# Patient Record
Sex: FEMALE | Race: BLACK OR AFRICAN AMERICAN | Employment: UNEMPLOYED | URBAN - METROPOLITAN AREA
[De-identification: names, ages, dates, MRNs, and addresses within clinical notes are randomized per-mention and may not be internally consistent; named-entity substitution may affect disease eponyms.]

---

## 2022-08-02 ENCOUNTER — OFFICE VISIT (OUTPATIENT)
Dept: SURGERY | Age: 52
End: 2022-08-02
Payer: MEDICARE

## 2022-08-02 VITALS
BODY MASS INDEX: 38.57 KG/M2 | RESPIRATION RATE: 16 BRPM | TEMPERATURE: 97.2 F | HEART RATE: 66 BPM | HEIGHT: 66 IN | DIASTOLIC BLOOD PRESSURE: 65 MMHG | OXYGEN SATURATION: 100 % | WEIGHT: 240 LBS | SYSTOLIC BLOOD PRESSURE: 117 MMHG

## 2022-08-02 DIAGNOSIS — G47.33 OBSTRUCTIVE SLEEP APNEA SYNDROME: ICD-10-CM

## 2022-08-02 DIAGNOSIS — Z01.818 PREOP EXAMINATION: ICD-10-CM

## 2022-08-02 DIAGNOSIS — R68.89 ABNORMAL WEIGHT: ICD-10-CM

## 2022-08-02 DIAGNOSIS — E66.01 MORBID OBESITY DUE TO EXCESS CALORIES (HCC): Primary | ICD-10-CM

## 2022-08-02 DIAGNOSIS — K91.2 POSTSURGICAL MALABSORPTION: ICD-10-CM

## 2022-08-02 DIAGNOSIS — Z98.84 HISTORY OF GASTRIC BYPASS: ICD-10-CM

## 2022-08-02 DIAGNOSIS — K21.9 GASTROESOPHAGEAL REFLUX DISEASE WITHOUT ESOPHAGITIS: ICD-10-CM

## 2022-08-02 DIAGNOSIS — D53.9 ANEMIA, DEFICIENCY: ICD-10-CM

## 2022-08-02 DIAGNOSIS — I77.9 PERIPHERAL ARTERIAL OCCLUSIVE DISEASE (HCC): ICD-10-CM

## 2022-08-02 PROBLEM — E55.9 VITAMIN D DEFICIENCY: Status: ACTIVE | Noted: 2020-11-09

## 2022-08-02 PROBLEM — G25.81 RESTLESS LEG SYNDROME: Status: ACTIVE | Noted: 2020-07-21

## 2022-08-02 PROBLEM — G62.9 POLYNEUROPATHY: Status: ACTIVE | Noted: 2018-05-16

## 2022-08-02 PROBLEM — R60.0 BILATERAL LEG EDEMA: Status: ACTIVE | Noted: 2017-09-08

## 2022-08-02 PROBLEM — I89.0 LYMPHEDEMA: Status: ACTIVE | Noted: 2017-10-19

## 2022-08-02 PROCEDURE — 99205 OFFICE O/P NEW HI 60 MIN: CPT | Performed by: SURGERY

## 2022-08-02 RX ORDER — GABAPENTIN 300 MG/1
300 CAPSULE ORAL 3 TIMES DAILY
COMMUNITY

## 2022-08-02 RX ORDER — ROPINIROLE 0.5 MG/1
0.5 TABLET, FILM COATED ORAL
COMMUNITY

## 2022-08-02 RX ORDER — PANTOPRAZOLE SODIUM 40 MG/1
40 TABLET, DELAYED RELEASE ORAL DAILY
COMMUNITY
Start: 2021-10-05 | End: 2022-10-05

## 2022-08-02 RX ORDER — FAMOTIDINE 40 MG/1
40 TABLET, FILM COATED ORAL DAILY
COMMUNITY
Start: 2021-10-05

## 2022-08-02 RX ORDER — TOPIRAMATE 25 MG/1
TABLET ORAL 2 TIMES DAILY
COMMUNITY

## 2022-08-02 NOTE — PROGRESS NOTES
Bariatric Surgery Consultation    CC: Consultation from [facility], [address] regarding surgical treatment options for morbid obesity and related comorbidities  Subjective: The patient is a 46 y.o. obese  female with a Body mass index is 39.33 kg/m². . They have been considering surgery for some time now. The patient presents to the clinic today to discuss surgical weight loss options. They have made multiple attempts at weight loss over the years without success. They have tried low-carb, low calorie, high-protein diets, prior open gastric bypass and dietitian supervised diets. Unfortunately, none of these have lead to meaningful, sustained weight loss. They have attended the bariatric seminar before the visit. Open RYGB 1999 with Dr. Emma Linares  Maximum pre-RYGB weight: 282lbs  Nadier post-RYGB weight: 156lbs by 2 years postop  Weight recurrence: began years ago without clear triggers that she can identify    DIET:  2-3 meals per day, large meals/portions  A few years ago struggled with increased calorie intake, but has made multiple dietary adjustments over the last 2 years. Grazing behaviors: chips, occasional chocolate, baked goods, some candy  Reports nausea, vomiting/regurgitation with large meals/portions, 1-2x/week at most, triggered by tougher meats and some starches  Rare dysphagia (cervical level at site of prior C5-C6 fusion)  Reports occasional reflux, triggered by same things that trigger the nausea/regurgitation, takes H2 blocker PRN with relief. Takes multivitamin, D3, occasional B vitamins, but regimen is unclear.  Got iron infusion last year, and has had B vitamin injections in the past.    EXERCISE:  Walking regimen    Pre op weight: 240  EBW: 103  Goal Weight Calc Women: 157.6  Wt loss to date: 0     Patient Active Problem List    Diagnosis Date Noted    Morbid obesity due to excess calories ([facility] 08/02/2022    History of gastric bypass 08/02/2022    Postsurgical malabsorption 2022    Gastroesophageal reflux disease without esophagitis 2021    Anemia, deficiency 2020    Vitamin D deficiency 2020    Obstructive sleep apnea syndrome 2020    Restless leg syndrome 2020    Polyneuropathy 2018    Lymphedema 10/19/2017    Bilateral leg edema 2017    Peripheral arterial occlusive disease ([facility] [address] 2016      Past Medical History:   Diagnosis Date    Anemia     Arthritis     Asthma     Frequency of urination     Lumbago with sciatica     Nocturia     OAB (overactive bladder)     Recurrent UTI     Tendonitis     Urinary urgency     Vitamin B12 deficiency      Past Surgical History:   Procedure Laterality Date    HX ABDOMINOPLASTY      HX  SECTION      x3. HX CHOLECYSTECTOMY      HX COLONOSCOPY      HX GASTRIC BYPASS      HX HYSTERECTOMY      HX HYSTERECTOMY  2013      Social History     Tobacco Use    Smoking status: Never    Smokeless tobacco: Never   Substance Use Topics    Alcohol use: No      History reviewed. No pertinent family history. Prior to Admission medications    Medication Sig Start Date End Date Taking? Authorizing Provider   folic acid-vit T8-SLO Y89 (FOLBEE, AV-ALAINA, VIRT-ALAINA) 2.5-25-1 mg tablet Take 1 Tablet by mouth in the morning. Yes Provider, Historical   famotidine (PEPCID) 40 mg tablet Take 40 mg by mouth in the morning. 10/5/21  Yes Provider, Historical   pantoprazole (PROTONIX) 40 mg tablet Take 40 mg by mouth in the morning. 10/5/21 10/5/22 Yes Provider, Historical   rOPINIRole (REQUIP) 0.5 mg tablet Take 0.5 mg by mouth in the morning. Yes Provider, Historical   gabapentin (NEURONTIN) 300 mg capsule Take 300 mg by mouth three (3) times daily. Yes Provider, Historical   topiramate (Topamax) 25 mg tablet Take  by mouth two (2) times a day. Yes Provider, Historical   cyclobenzaprine (FLEXERIL) 10 mg tablet 10 mg.    Yes Provider, Historical   albuterol (PROVENTIL HFA, VENTOLIN HFA, PROAIR HFA) 90 mcg/actuation inhaler Take  inhaled by mouth Take As Needed. Yes Provider, Historical   cholecalciferol (VITAMIN D3) 25 mcg (1,000 unit) cap Take  by mouth daily. Yes Provider, Historical   aspirin 81 mg chewable tablet Take 81 mg by mouth daily. Yes Provider, Historical   mirabegron ER (MYRBETRIQ) 50 mg ER tablet Take 1 Tab by mouth daily. 7/26/17  Yes Niurka Gomez MD   LYRICA 50 mg capsule  7/20/17   Provider, Historical   pravastatin (PRAVACHOL) 40 mg tablet 40 mg. Patient not taking: Reported on 8/2/2022    Provider, Historical   montelukast (SINGULAIR) 10 mg tablet Take 10 mg by mouth daily. Patient not taking: Reported on 8/2/2022    Provider, Historical   fluticasone propionate (FLONASE) 50 mcg/actuation nasal spray 2 Sprays by Both Nostrils route daily.   Patient not taking: Reported on 8/2/2022    Provider, Historical     Allergies   Allergen Reactions    Codeine Other (comments)     abd cramping    Duloxetine Hcl Other (comments)     Other reaction(s): GI upset    Sertraline Other (comments)     GI upset    Tramadol Other (comments)     Gi distress          Review of Systems:    Constitutional: No fever or chills  Neurologic: No headache  Eyes: No scleral icterus or irritated eyes  Nose: No nasal pain or drainage  Mouth: No oral lesions or sore throat  Cardiac: No palpations or chest pain  Pulmonary: No cough or shortness of breath  Gastrointestinal: See HPI, no diarrhea/constipation  Genitourinary: No dysuria  Musculoskeletal: No muscle or joint tenderness  Skin: No rashes or lesions  Psychiatric: No anxiety or depressed mood    Pertinent positives: See HPI      Objective:     Physical Exam:  Visit Vitals  /65   Pulse 66   Temp 97.2 °F (36.2 °C)   Resp 16   Ht 5' 5.5\" (1.664 m)   Wt 108.9 kg (240 lb)   SpO2 100%   BMI 39.33 kg/m²     General: No acute distress, conversant  Eyes: PERRLA, no scleral icterus  HENT: Normocephalic without oral lesions  Neck: Trachea midline  Cardiac: Normal pulse rate and rhythm, no edema, capillary refill normal 1 second  Pulmonary: Symmetric chest rise with normal effort, clear to auscultation though mildly obscured by body habitus  GI: Soft, NT, ND, no hernia, no splenomegaly  Skin: Warm without rash  Extremities: No edema or joint stiffness, moves all extremities symmetrically, steady gait  Psych: Appropriate mood and affect    Assessment:     Morbid obesity with comorbidities, history of gastric bypass, recurrent morbid obesity  Plan:   The patient presents today with severe obesity and obesity related risks/comorbidities as detailed above. The patient has made multiple unsuccessful attempts at weight loss as detailed above. The patient desires surgical weight loss for a better chance of lifelong weight control and control of co-morbidities. They have attended the bariatric seminar and meet the qualifications for surgery based on the NIH criteria. We have discussed the various surgical options including the sleeve gastrectomy, gastric bypass, duodenal switch/modified duodenal switch. We also discussed non operative alternatives. The patient is currently interested in the revision of gastric bypass. They will need to a/an UGI and EGD with me to evaluate their anatomy pre operatively. H pylori antigen/breath test ordered as well.     We have discussed the possible complications of bariatric surgery which include but are not limited to failed weight loss, weight regain, malnutrition, leak, bleed, stricture, gastric ulcer, gastric fistula, gastric bleed, gallstones, new or worsening gastric reflux, nausea, emesis, internal hernia, abdominal wall hernia, gastric perforation, need for revision / conversion / or reversal, pregnancy complications and loss, intestinal ischemia, post operative skin complications, possible thinning of their hair, bowel obstruction, dumping syndrome, wound infection, blood clots (DVT, mesenteric thrombus, pulmonary embolism), increased addictive tendency, risk of anesthesia, and death. The patient understands this is a life altering decision and will require compliance to the program for the remainder of their life in order to be monitored to avoid complication and ensure successful, sustained weight control. They will be placed on a lifelong low carbohydrate and low sugar diet, exercise, and vitamin regimen and will require frequent blood draws and office visits to ensure adequate nutrition and program compliance. Visits and follow up will be in compliance with the guidelines set forth by [facility]. I have specifically mentioned the need to avoid all personal and second-hand tobacco exposure, systemic steroids, and NSAIDS after any bariatric surgery to help avoid the above listed complications. The patient has expressed understanding of the above and would like to enroll in the program. The patient will be submitted for medical and psychological clearance along with establishing with out dietician and joining the pre / post operative support group. They will be screened for depression and sleep apnea and treated pre operatively if needed. After successful completion of the preoperative regimen the patient will be submitted for insurance approval and pending this will be scheduled for surgery. Tests/clearances ordered: Will start with labs, EGD and UGI prior to starting rest of program    Bariatric lab panel, CXR, EKG, UGI, EGD with me  Nutrition, support group, PCP clearance, psychological clearance, cardiac clearance      All questions from the patient have been answered and they have demonstrated appropriate understanding of the process. RESULTS:   UGI outside report (images not available):   \"Focal neostomach contrast filled outpouching, tertiary nonpropulsive contractions of the esophagus, LANDON    Thiamin normal  H pylori negative    Signed By: Oni Estes MD [facility]  Bariatric and [address] [place] [address] [facility]    August 2, 2022

## 2022-08-02 NOTE — PROGRESS NOTES
Dereck Austin is a 46 y.o. female (: 1970) presenting to address:    Chief Complaint   Patient presents with    New Patient     Bariatric revision consult       Medication list and allergies have been reviewed with Dereck Austin and updated as of today's date. I have gone over all Medical, Surgical and Social History with Dereck Austin and updated/added the information accordingly.

## 2022-08-02 NOTE — LETTER
8/2/2022    Patient: Jose Guadalupe Andrews   YOB: 1970   Date of Visit: 8/2/2022     Lucero Emerson PA-C  [address] [address]  Via Fax: [phone]     [facility] [address] [address]  Via Fax: [phone]    Dear Lucero Emerson PA-C  [address] [place] [address], [address],      Thank you for referring Ms. Jose Guadalupe Andrews to [facility]  for evaluation. My notes for this consultation are attached. If you have questions, please do not hesitate to call me. I look forward to following your patient along with you.       Sincerely,    Cassius Varma MD

## 2022-09-12 RX ORDER — FERROUS SULFATE, DRIED 160(50) MG
1 TABLET, EXTENDED RELEASE ORAL DAILY
COMMUNITY

## 2022-09-12 NOTE — PERIOP NOTES
PRE-SURGICAL INSTRUCTIONS        Patient's Name:  Renzo MAHAJAN Date:  9/12/2022            Covid Testing Date and Time:    Surgery Date:  9/16/2022                Do NOT eat or drink anything, including candy, gum, or ice chips after midnight on 09/15, unless you have specific instructions from your surgeon or anesthesia provider to do so. You may brush your teeth before coming to the hospital.  No smoking 24 hours prior to the day of surgery. No alcohol 24 hours prior to the day of surgery. No recreational drugs for one week prior to the day of surgery. Leave all valuables, including money/purse, at home. Remove all jewelry, nail polish, acrylic nails, and makeup (including mascara); no lotions powders, deodorant, or perfume/cologne/after shave on the skin. Follow instruction for Hibiclens washes and CHG wipes from surgeon's office. Glasses/contact lenses and dentures may be worn to the hospital.  They will be removed prior to surgery. Call your doctor if symptoms of a cold or illness develop within 24-48 hours prior to your surgery. 11.  If you are having an outpatient procedure, please make arrangements for a responsible ADULT TO [address] [place] [address] and stay with you for 24 hours after your surgery. 12. ONE VISITOR in the hospital at this time for outpatient procedures. Exceptions may be made for surgical admissions, per nursing unit guidelines      Special Instructions:      Bring list of CURRENT medications. Bring inhaler. Bring any pertinent legal medical records. Take these medications the morning of surgery with a sip of water:  None  Follow physician instructions about stopping anticoagulants. On the day of surgery, come in the main entrance of [facility]. Let the  at the desk know you are there for surgery. A staff member will come escort you to the surgical area on the second floor.     If you have any questions or concerns, please do not hesitate to call:     (Prior to the day of surgery) [facility] department:  [phone]   (Day of surgery) Pre-Op department:  [phone]    These surgical instructions were reviewed with Danay Darling during the [facility] phone call.

## 2022-09-15 ENCOUNTER — ANESTHESIA EVENT (OUTPATIENT)
Dept: ENDOSCOPY | Age: 52
End: 2022-09-15
Payer: MEDICARE

## 2022-09-16 ENCOUNTER — HOSPITAL ENCOUNTER (OUTPATIENT)
Age: 52
Setting detail: OUTPATIENT SURGERY
Discharge: HOME | End: 2022-09-16
Attending: SURGERY | Admitting: SURGERY
Payer: MEDICARE

## 2022-09-16 ENCOUNTER — ANESTHESIA (OUTPATIENT)
Dept: ENDOSCOPY | Age: 52
End: 2022-09-16
Payer: MEDICARE

## 2022-09-16 VITALS
HEART RATE: 71 BPM | TEMPERATURE: 97.3 F | HEIGHT: 65 IN | SYSTOLIC BLOOD PRESSURE: 96 MMHG | WEIGHT: 242 LBS | RESPIRATION RATE: 15 BRPM | BODY MASS INDEX: 40.32 KG/M2 | DIASTOLIC BLOOD PRESSURE: 65 MMHG | OXYGEN SATURATION: 100 %

## 2022-09-16 PROCEDURE — 2709999900 HC NON-CHARGEABLE SUPPLY: Performed by: SURGERY

## 2022-09-16 PROCEDURE — 77030008565 HC TBNG SUC IRR ERBE -B: Performed by: SURGERY

## 2022-09-16 PROCEDURE — 00731 ANES UPR GI NDSC PX NOS: CPT | Performed by: NURSE ANESTHETIST, CERTIFIED REGISTERED

## 2022-09-16 PROCEDURE — 74011250636 HC RX REV CODE- 250/636: Performed by: NURSE ANESTHETIST, CERTIFIED REGISTERED

## 2022-09-16 PROCEDURE — 74011000250 HC RX REV CODE- 250: Performed by: NURSE ANESTHETIST, CERTIFIED REGISTERED

## 2022-09-16 PROCEDURE — 76060000031 HC ANESTHESIA FIRST 0.5 HR: Performed by: SURGERY

## 2022-09-16 PROCEDURE — 00731 ANES UPR GI NDSC PX NOS: CPT | Performed by: ANESTHESIOLOGY

## 2022-09-16 PROCEDURE — 43235 EGD DIAGNOSTIC BRUSH WASH: CPT | Performed by: SURGERY

## 2022-09-16 PROCEDURE — 76040000019: Performed by: SURGERY

## 2022-09-16 RX ORDER — INSULIN LISPRO 100 [IU]/ML
INJECTION, SOLUTION INTRAVENOUS; SUBCUTANEOUS ONCE
Status: DISCONTINUED | OUTPATIENT
Start: 2022-09-16 | End: 2022-09-16 | Stop reason: HOSPADM

## 2022-09-16 RX ORDER — SODIUM CHLORIDE, SODIUM LACTATE, POTASSIUM CHLORIDE, CALCIUM CHLORIDE 600; 310; 30; 20 MG/100ML; MG/100ML; MG/100ML; MG/100ML
50 INJECTION, SOLUTION INTRAVENOUS CONTINUOUS
Status: DISCONTINUED | OUTPATIENT
Start: 2022-09-16 | End: 2022-09-16 | Stop reason: HOSPADM

## 2022-09-16 RX ORDER — PROPOFOL 10 MG/ML
INJECTION, EMULSION INTRAVENOUS AS NEEDED
Status: DISCONTINUED | OUTPATIENT
Start: 2022-09-16 | End: 2022-09-16 | Stop reason: HOSPADM

## 2022-09-16 RX ADMIN — SODIUM CHLORIDE, POTASSIUM CHLORIDE, SODIUM LACTATE AND CALCIUM CHLORIDE 50 ML/HR: 600; 310; 30; 20 INJECTION, SOLUTION INTRAVENOUS at 12:38

## 2022-09-16 RX ADMIN — PROPOFOL 50 MG: 10 INJECTION, EMULSION INTRAVENOUS at 13:16

## 2022-09-16 RX ADMIN — PROPOFOL 30 MG: 10 INJECTION, EMULSION INTRAVENOUS at 13:19

## 2022-09-16 RX ADMIN — FAMOTIDINE 20 MG: 10 INJECTION, SOLUTION INTRAVENOUS at 12:36

## 2022-09-16 RX ADMIN — PROPOFOL 20 MG: 10 INJECTION, EMULSION INTRAVENOUS at 13:18

## 2022-09-16 RX ADMIN — PROPOFOL 50 MG: 10 INJECTION, EMULSION INTRAVENOUS at 13:21

## 2022-09-16 NOTE — DISCHARGE INSTRUCTIONS
Upper GI Endoscopy: What to Expect at [address] had an upper GI endoscopy. Your doctor used a thin, lighted tube that bends to look at the inside of your esophagus, your stomach, and the first part of the small intestine, called the duodenum. After you have an endoscopy, you will stay at the hospital or clinic for 1 to 2 hours. This will allow the medicine to wear off. You will be able to go home after your doctor or nurse checks to make sure that you're not having any problems. You may have to stay overnight if you had treatment during the test. You may have a sore throat for a day or two after the test.  This care sheet gives you a general idea about what to expect after the test.  How can you care for yourself at home? Activity   Rest as much as you need to after you go home. You should be able to go back to your usual activities the day after the test.  Diet   Follow your doctor's directions for eating after the test.  Drink plenty of fluids (unless your doctor has told you not to). Medications   If you have a sore throat the day after the test, use an over-the-counter spray to numb your throat. Follow-up care is a key part of your treatment and safety. Be sure to make and go to all appointments, and call your doctor if you are having problems. It's also a good idea to know your test results and keep a list of the medicines you take. When should you call for help? Call 911 anytime you think you may need emergency care. For example, call if:    You passed out (lost consciousness). You have trouble breathing. You pass maroon or bloody stools. Call your doctor now or seek immediate medical care if:    You have pain that does not get better after your take pain medicine. You have new or worse belly pain. You have blood in your stools. You are sick to your stomach and cannot keep fluids down. You have a fever. You cannot pass stools or gas.    Watch closely for changes in your health, and be sure to contact your doctor if:    Your throat still hurts after a day or two. You do not get better as expected. Where can you learn more? Go to http://www.bang.com/  Enter J454 in the search box to learn more about \"Upper GI Endoscopy: What to Expect at Home. \"  Current as of: September 8, 2021               Content Version: 13.2  © 2006-2022 [employer]. Care instructions adapted under license by [employer] (which disclaims liability or warranty for this information). If you have questions about a medical condition or this instruction, always ask your healthcare professional. [place] [address] any warranty or liability for your use of this information. DISCHARGE SUMMARY from Nurse     POST-PROCEDURE INSTRUCTIONS:    Call your Physician if you:  Observe any excess bleeding. Develop a temperature over 100.5o F. Experience abdominal, shoulder or chest pain. Notice any signs of decreased circulation or nerve impairment to an extremity such as a change in color, persistent numbness, tingling, coldness or increase in pain. Vomit blood or you have nausea and vomiting lasting longer than 4 hours. Are unable to take medications. Are unable to urinate within 8 hours after discharge following general anesthesia or intravenous sedation. For the next 24 hours after receiving general anesthesia or intravenous sedation, or while taking prescription Narcotics, limit your activities:  Do NOT drive a motor vehicle, operate hazard machinery or power tools, or perform tasks that require coordination. The medication you received during your procedure may have some effect on your mental awareness. Do NOT make important personal or business decisions. The medication you received during your procedure may have some effect on your mental awareness. Do NOT drink alcoholic beverages.   These drinks do not mix well with the medications that have been given to you. Upon discharge from the hospital, you must be accompanied by a responsible adult. Resume your diet as directed by your physician. Resume medications as your physician has prescribed. Please give a list of your current medications to your Primary Care Provider. Please update this list whenever your medications are discontinued, doses are changed, or new medications (including over-the-counter products) are added. Please carry medication information at all times in case of emergency situations. These are general instructions for a healthy lifestyle:    No smoking/ No tobacco products/ Avoid exposure to second hand smoke. Surgeon General's Warning:  Quitting smoking now greatly reduces serious risk to your health. Obesity, smoking, and a sedentary lifestyle greatly increase your risk for illness. A healthy diet, regular physical exercise & weight monitoring are important for maintaining a healthy lifestyle  You may be retaining fluid if you have a history of heart failure or if you experience any of the following symptoms:  Weight gain of 3 pounds or more overnight or 5 pounds in a week, increased swelling in our hands or feet or shortness of breath while lying flat in bed. Please call your doctor as soon as you notice any of these symptoms; do not wait until your next office visit. Recognize signs and symptoms of STROKE:  F  -  Face looks uneven  A  -  Arms unable to move or move unevenly  S  -  Speech slurred or non-existent  T  -  Time to call 911 - as soon as signs and symptoms begin - DO NOT go back to bed or wait to see If you get better - TIME IS BRAIN. Colorectal Screening  Colorectal cancer almost always develops from precancerous polyps (abnormal growths) in the colon or rectum. Screening tests can find precancerous polyps, so that they can be removed before they turn into cancer.  Screening tests can also find colorectal cancer early, when treatment works best.  Speak with your physician about when you should begin screening and how often you should be tested. [employer] Activation    Thank you for requesting access to [facility] ([facility]). Please follow the instructions below to securely access and download your online medical record. [facility] ([facility]) allows you to send messages to your doctor, view your test results, renew your prescriptions, schedule appointments, and more. How Do I Sign Up? In your internet browser, go to [email or web address]. Click on the First Time User? Click Here link in the Sign In box. You will see the New Member Sign Up page. Enter your [employer] Access Code exactly as it appears below. You will not need to use this code after youve completed the sign-up process. If you do not sign up before the expiration date, you must request a new code. [facility] ([facility]) Access Code: Activation code not generated  Current [facility] ([facility]) Status: Active (This is the date your [facility] ([facility]) access code will )    Enter the last four digits of your Social Security Number (xxxx) and Date of Birth (mm/dd/yyyy) as indicated and click Submit. You will be taken to the next sign-up page. Create a [employer] ID. This will be your [facility] (HDS) login ID and cannot be changed, so think of one that is secure and easy to remember. Create a [facility] ([facility]) password. You can change your password at any time. Enter your Password Reset Question and Answer. This can be used at a later time if you forget your password. Enter your e-mail address. You will receive e-mail notification when new information is available in [address] Click Sign Up. You can now view and download portions of your medical record. Click the [employer] link to download a portable copy of your medical information. Additional Information    If you have questions, please call [phone]. Remember, [facility] ([facility]) is NOT to be used for urgent needs.  For medical emergencies, dial 911. Educational references and/or instructions provided during this visit included:    See Attached      APPOINTMENTS:    Per MD Instruction    Discharge information has been reviewed with the patient. The patient verbalized understanding.     Patient armband removed and shredded

## 2022-09-16 NOTE — ANESTHESIA PREPROCEDURE EVALUATION
Relevant Problems   RESPIRATORY SYSTEM   (+) Obstructive sleep apnea syndrome      CARDIOVASCULAR   (+) Peripheral arterial occlusive disease (HCC)      GASTROINTESTINAL   (+) Gastroesophageal reflux disease without esophagitis      ENDOCRINE   (+) Morbid obesity due to excess calories (HCC)      HEMATOLOGY   (+) Anemia, deficiency       Anesthetic History   No history of anesthetic complications            Review of Systems / Medical History  Patient summary reviewed and pertinent labs reviewed    Pulmonary        Sleep apnea    Asthma        Neuro/Psych              Cardiovascular                       GI/Hepatic/Renal                Endo/Other        Morbid obesity and arthritis     Other Findings              Physical Exam    Airway  Mallampati: III  TM Distance: 4 - 6 cm  Neck ROM: normal range of motion   Mouth opening: Diminished (comment)     Cardiovascular    Rhythm: regular  Rate: normal         Dental    Dentition: Poor dentition     Pulmonary      Decreased breath sounds: bilateral           Abdominal  GI exam deferred       Other Findings            Anesthetic Plan    ASA: 3  Anesthesia type: MAC            Anesthetic plan and risks discussed with: Patient

## 2022-09-16 NOTE — H&P
The patient is a 46 y.o. obese  female with a Body mass index is 39.33 kg/m². . They have been considering surgery for some time now. The patient presents to the clinic today to discuss surgical weight loss options. They have made multiple attempts at weight loss over the years without success. They have tried low-carb, low calorie, high-protein diets, prior open gastric bypass and dietitian supervised diets. Unfortunately, none of these have lead to meaningful, sustained weight loss. They have attended the bariatric seminar before the visit. Open RYGB 1999 with Dr. Sunny Shah  Maximum pre-RYGB weight: 282lbs  Nadier post-RYGB weight: 156lbs by 2 years postop  Weight recurrence: began years ago without clear triggers that she can identify     DIET:  2-3 meals per day, large meals/portions  A few years ago struggled with increased calorie intake, but has made multiple dietary adjustments over the last 2 years. Grazing behaviors: chips, occasional chocolate, baked goods, some candy  Reports nausea, vomiting/regurgitation with large meals/portions, 1-2x/week at most, triggered by tougher meats and some starches  Rare dysphagia (cervical level at site of prior C5-C6 fusion)  Reports occasional reflux, triggered by same things that trigger the nausea/regurgitation, takes H2 blocker PRN with relief. Takes multivitamin, D3, occasional B vitamins, but regimen is unclear.  Got iron infusion last year, and has had B vitamin injections in the past.     EXERCISE:  Walking regimen     Pre op weight: 240  EBW: 103  Goal Weight Calc Women: 157.6  Wt loss to date: 0           Patient Active Problem List     Diagnosis Date Noted    Morbid obesity due to excess calories ([facility] [address] 08/02/2022    History of gastric bypass 08/02/2022    Postsurgical malabsorption 08/02/2022    Gastroesophageal reflux disease without esophagitis 03/22/2021    Anemia, deficiency 11/09/2020    Vitamin D deficiency 11/09/2020    Obstructive sleep apnea syndrome 2020    Restless leg syndrome 2020    Polyneuropathy 2018    Lymphedema 10/19/2017    Bilateral leg edema 2017    Peripheral arterial occlusive disease ([facility] [address] 2016           Past Medical History:   Diagnosis Date    Anemia      Arthritis      Asthma      Frequency of urination      Lumbago with sciatica      Nocturia      OAB (overactive bladder)      Recurrent UTI      Tendonitis      Urinary urgency      Vitamin B12 deficiency              Past Surgical History:   Procedure Laterality Date    HX ABDOMINOPLASTY        HX  SECTION         x3. HX CHOLECYSTECTOMY        HX COLONOSCOPY        HX GASTRIC BYPASS       HX HYSTERECTOMY        HX HYSTERECTOMY   2013      Social History           Tobacco Use    Smoking status: Never    Smokeless tobacco: Never   Substance Use Topics    Alcohol use: No      History reviewed. No pertinent family history. Prior to Admission medications    Medication Sig Start Date End Date Taking? Authorizing Provider   folic acid-vit Z1-NFR Z99 (FOLBEE, AV-ALAINA, VIRT-ALAINA) 2.5-25-1 mg tablet Take 1 Tablet by mouth in the morning. Yes Provider, Historical   famotidine (PEPCID) 40 mg tablet Take 40 mg by mouth in the morning. 10/5/21   Yes Provider, Historical   pantoprazole (PROTONIX) 40 mg tablet Take 40 mg by mouth in the morning. 10/5/21 10/5/22 Yes Provider, Historical   rOPINIRole (REQUIP) 0.5 mg tablet Take 0.5 mg by mouth in the morning. Yes Provider, Historical   gabapentin (NEURONTIN) 300 mg capsule Take 300 mg by mouth three (3) times daily. Yes Provider, Historical   topiramate (Topamax) 25 mg tablet Take  by mouth two (2) times a day. Yes Provider, Historical   cyclobenzaprine (FLEXERIL) 10 mg tablet 10 mg. Yes Provider, Historical   albuterol (PROVENTIL HFA, VENTOLIN HFA, PROAIR HFA) 90 mcg/actuation inhaler Take  inhaled by mouth Take As Needed.      Yes Provider, Historical cholecalciferol (VITAMIN D3) 25 mcg (1,000 unit) cap Take  by mouth daily. Yes Provider, Historical   aspirin 81 mg chewable tablet Take 81 mg by mouth daily. Yes Provider, Historical   mirabegron ER (MYRBETRIQ) 50 mg ER tablet Take 1 Tab by mouth daily. 7/26/17   Yes Kandi Miller MD   LYRICA 50 mg capsule   7/20/17     Provider, Historical   pravastatin (PRAVACHOL) 40 mg tablet 40 mg. Patient not taking: Reported on 8/2/2022       Provider, Historical   montelukast (SINGULAIR) 10 mg tablet Take 10 mg by mouth daily. Patient not taking: Reported on 8/2/2022       Provider, Historical   fluticasone propionate (FLONASE) 50 mcg/actuation nasal spray 2 Sprays by Both Nostrils route daily.   Patient not taking: Reported on 8/2/2022       Provider, Historical            Allergies   Allergen Reactions    Codeine Other (comments)       abd cramping    Duloxetine Hcl Other (comments)       Other reaction(s): GI upset    Sertraline Other (comments)       GI upset    Tramadol Other (comments)       Gi distress            Review of Systems:    Constitutional: No fever or chills  Neurologic: No headache  Eyes: No scleral icterus or irritated eyes  Nose: No nasal pain or drainage  Mouth: No oral lesions or sore throat  Cardiac: No palpations or chest pain  Pulmonary: No cough or shortness of breath  Gastrointestinal: See HPI, no diarrhea/constipation  Genitourinary: No dysuria  Musculoskeletal: No muscle or joint tenderness  Skin: No rashes or lesions  Psychiatric: No anxiety or depressed mood     Pertinent positives: See HPI        Objective:      Physical Exam:  Visit Vitals  /65   Pulse 66   Temp 97.2 °F (36.2 °C)   Resp 16   Ht 5' 5.5\" (1.664 m)   Wt 108.9 kg (240 lb)   SpO2 100%   BMI 39.33 kg/m²      General: No acute distress, conversant  Eyes: PERRLA, no scleral icterus  HENT: Normocephalic without oral lesions  Neck: Trachea midline  Cardiac: Normal pulse rate and rhythm, no edema, capillary refill normal 1 second  Pulmonary: Symmetric chest rise with normal effort, clear to auscultation though mildly obscured by body habitus  GI: Soft, NT, ND, no hernia, no splenomegaly  Skin: Warm without rash  Extremities: No edema or joint stiffness, moves all extremities symmetrically, steady gait  Psych: Appropriate mood and affect     Assessment:      Morbid obesity with comorbidities, history of gastric bypass, recurrent morbid obesity  Plan:   EGD today

## 2022-09-16 NOTE — BRIEF OP NOTE
Brief Postoperative Note    Patient: Blanka Lawler  YOB: 1970  MRN: [id number]    Date of Procedure: 9/16/2022     Pre-Op Diagnosis: Morbid obesity ([facility] [address] [E66.01]  Gastroesophageal reflux disease, unspecified whether esophagitis present [K21.9]  Postsurgical malabsorption [K91.2]  H/O gastric bypass [Z98.84]  Peripheral arterial occlusive disease ([facility] [address] [I77.9]  Anemia, unspecified type [D64.9]  JOSIE (obstructive sleep apnea) [G47.33]    Post-Op Diagnosis:   Morbid obesity ([address] [E66.01]  Gastroesophageal reflux disease, unspecified whether esophagitis present [K21.9]  Postsurgical malabsorption [K91.2]  H/O gastric bypass [Z98.84]  Peripheral arterial occlusive disease ([address] [I77.9]  Anemia, unspecified type [D64.9]  JOSIE (obstructive sleep apnea) [G47.33]    Procedure(s):  ESOPHAGOGASTRODUODENOSCOPY (EGD)    Surgeon(s):  Keyanna Irving MD    Surgical Assistant: None    Anesthesia: MAC     Estimated Blood Loss (mL): Minimal    Complications: None    Specimens: * No specimens in log *     Implants: * No implants in log *    Drains: * No LDAs found *    Findings:   3x4cm gastric pouch  3cm gastrojejunal anastomosis (dilated gastrojejunostomy, technical failure of gastric bypass)  5cm blind end of jaci limb (candy cane)  Normal jaci limb mucosa  Unable to retroflex in pouch due to small pouch, possible laxity at hiatus    Electronically Signed by Abbey Avitia MD on 9/16/2022 at 1:25 PM

## 2022-09-16 NOTE — ANESTHESIA POSTPROCEDURE EVALUATION
Procedure(s):  ESOPHAGOGASTRODUODENOSCOPY (EGD). MAC    Anesthesia Post Evaluation      Multimodal analgesia: multimodal analgesia used between 6 hours prior to anesthesia start to PACU discharge  Patient location during evaluation: bedside  Patient participation: complete - patient participated  Level of consciousness: awake  Pain management: adequate  Airway patency: patent  Anesthetic complications: no  Cardiovascular status: stable  Respiratory status: acceptable  Hydration status: acceptable  Post anesthesia nausea and vomiting:  controlled      INITIAL Post-op Vital signs:   Vitals Value Taken Time   BP 99/73 09/16/22 1354   Temp 36.7 °C (98 °F) 09/16/22 1329   Pulse 67 09/16/22 1356   Resp 16 09/16/22 1356   SpO2 100 % 09/16/22 1356   Vitals shown include unvalidated device data.

## 2022-09-16 NOTE — OP NOTES
Operative Report    Patient: William Grove MRN: [id number]  SSN: [id number]    YOB: 1970  Age: 46 y.o. Sex: female       Date of Surgery: 9/16/2022     Preoperative Diagnosis: Morbid obesity ([facility] [address] [E66.01]  Gastroesophageal reflux disease, unspecified whether esophagitis present [K21.9]  Postsurgical malabsorption [K91.2]  H/O gastric bypass [Z98.84]  Peripheral arterial occlusive disease (HCC) [I77.9]  Anemia, unspecified type [D64.9]  JOSIE (obstructive sleep apnea) [G47.33]     Postoperative Diagnosis: * No post-op diagnosis entered *     Surgeon(s) and Role:     * Celina Wilson MD - Primary    Anesthesia: MAC     Procedure: Procedure(s):  ESOPHAGOGASTRODUODENOSCOPY (EGD)     Procedure in Detail: William Grove was identified in the pre-operative holding area. Informed consent was obtained after a complete discussion of risks, benefits and alternatives to surgery were had with the patient. The patient was brought back to the endoscopy room and placed under MAC in the supine position on the operating room table. A timeout was performed verifying patient identity, planned procedure, medications, and all other pertinent aspects of the case. The patient was appropriately padded and secured to the table. A bite block was applied. Once adequate sedation was achieved, the gastroscope was introduced transorally into the esophagus. The esophagus was traversed. Findings:   3x4cm gastric pouch  3cm gastrojejunal anastomosis (dilated gastrojejunostomy, technical failure of gastric bypass)  5cm blind end of jaci limb (candy cane)  Normal jaci limb mucosa  Unable to retroflex in pouch due to small pouch, possible laxity at hiatus    The scope was utilized to suction flat the stomach and was removed. The patient tolerated the procedure without incident and was awakened and transferred to PACU.       Estimated Blood Loss:  none    Tourniquet Time: * No tourniquets in log *      Implants: * No implants in log *            Specimens: * No specimens in log *        Drains: None                Complications: None    Counts: Sponge and needle counts were correct times two.     Signed By:  Liv Cabral MD     September 16, 2022

## 2022-10-04 ENCOUNTER — OFFICE VISIT (OUTPATIENT)
Dept: SURGERY | Age: 52
End: 2022-10-04
Payer: MEDICARE

## 2022-10-04 VITALS
HEIGHT: 66 IN | DIASTOLIC BLOOD PRESSURE: 64 MMHG | RESPIRATION RATE: 16 BRPM | SYSTOLIC BLOOD PRESSURE: 117 MMHG | BODY MASS INDEX: 40.02 KG/M2 | WEIGHT: 249 LBS | TEMPERATURE: 97.5 F | HEART RATE: 66 BPM

## 2022-10-04 DIAGNOSIS — Z98.84 HISTORY OF GASTRIC BYPASS: ICD-10-CM

## 2022-10-04 DIAGNOSIS — K91.1 POSTSURGICAL DUMPING SYNDROME: ICD-10-CM

## 2022-10-04 DIAGNOSIS — G47.33 OBSTRUCTIVE SLEEP APNEA SYNDROME: ICD-10-CM

## 2022-10-04 DIAGNOSIS — D53.9 ANEMIA, DEFICIENCY: ICD-10-CM

## 2022-10-04 DIAGNOSIS — E55.9 VITAMIN D DEFICIENCY: ICD-10-CM

## 2022-10-04 DIAGNOSIS — K95.89 COMPLICATIONS OF BARIATRIC PROCEDURES: ICD-10-CM

## 2022-10-04 DIAGNOSIS — K91.2 POSTSURGICAL MALABSORPTION: Primary | ICD-10-CM

## 2022-10-04 PROCEDURE — 99213 OFFICE O/P EST LOW 20 MIN: CPT | Performed by: SURGERY

## 2022-10-04 RX ORDER — ASPIRIN 325 MG
50000 TABLET, DELAYED RELEASE (ENTERIC COATED) ORAL
Qty: 12 CAPSULE | Refills: 0 | Status: CANCELLED | OUTPATIENT
Start: 2022-10-04

## 2022-10-04 NOTE — PROGRESS NOTES
Gwyn Jhaveri is a 46 y.o. female (: 1970) presenting to address:    Chief Complaint   Patient presents with    Follow-up     EGD and lab follow up       Medication list and allergies have been reviewed with Gwyn Jhaveri and updated as of today's date. I have gone over all Medical, Surgical and Social History with Gwyn Jhaveri and updated/added the information accordingly.

## 2022-10-04 NOTE — PROGRESS NOTES
Bariatric Surgery Progress Note    CC: Preop appointment for bariatric surgery  Subjective:     Patient presents for a preop appointment for bariatric surgery. Labs reviewed:  Vitamin D deficiency  Elevated .9  H pylori negative  Thiamine normal  Iron studies normal  Hgb 11.9, minimal anemia  TSH normal  Folate, B12 normal    UGI outside report (images not available): \"Focal neostomach contrast filled outpouching, tertiary nonpropulsive contractions of the esophagus, LANDON    EGD  Findings:   3x4cm gastric pouch  3cm gastrojejunal anastomosis (dilated gastrojejunostomy, technical failure of gastric bypass)  5cm blind end of jaci limb (candy cane, this must be the \"focal neostomach contrast filled outpouching\" seen on the UGI)  Normal jaci limb mucosa  Unable to retroflex in pouch due to small pouch, possible laxity at hiatus    Has started ProCare Chewable with iron, vitamin D daily and a weekly vitamin D 50,000IU dose, as well as calcium citrate TID.     Previous relevant surgeries: open gastric bypass, lap anel, vaginal hysterectomy, abdominoplasty    Patient Active Problem List    Diagnosis Date Noted    Morbid obesity due to excess calories ([facility] [address] 08/02/2022    History of gastric bypass 08/02/2022    Postsurgical malabsorption 08/02/2022    Gastroesophageal reflux disease without esophagitis 03/22/2021    Anemia, deficiency 11/09/2020    Vitamin D deficiency 11/09/2020    Obstructive sleep apnea syndrome 07/21/2020    Restless leg syndrome 07/21/2020    Polyneuropathy 05/16/2018    Lymphedema 10/19/2017    Bilateral leg edema 09/08/2017    Peripheral arterial occlusive disease ([facility] [address] 03/30/2016      Past Medical History:   Diagnosis Date    Anemia     Arthritis     Asthma     Frequency of urination     Lumbago with sciatica     Morbid obesity (HCC)     Nocturia     OAB (overactive bladder)     Recurrent UTI     Tendonitis     Urinary urgency     Vitamin B12 deficiency      Past Surgical History: Procedure Laterality Date    HX ABDOMINOPLASTY      HX  SECTION      x3. HX CHOLECYSTECTOMY      lap    HX COLONOSCOPY      HX GASTRIC BYPASS      open    HX HYSTERECTOMY      HX HYSTERECTOMY  2013    vaginal      Social History     Tobacco Use    Smoking status: Never    Smokeless tobacco: Never   Substance Use Topics    Alcohol use: No      History reviewed. No pertinent family history. Prior to Admission medications    Medication Sig Start Date End Date Taking? Authorizing Provider   calcium-vitamin D (OS-CHUNG +D3) 500 mg-200 unit per tablet Take 1 Tablet by mouth daily. Yes Provider, Historical   folic acid-vit P8-IKR M42 (FOLBEE, AV-ALAINA, VIRT-ALAINA) 2.5-25-1 mg tablet Take 1 Tablet by mouth in the morning. Yes Provider, Historical   famotidine (PEPCID) 40 mg tablet Take 40 mg by mouth in the morning. 10/5/21  Yes Provider, Historical   pantoprazole (PROTONIX) 40 mg tablet Take 40 mg by mouth in the morning. 10/5/21 10/5/22 Yes Provider, Historical   rOPINIRole (REQUIP) 0.5 mg tablet Take 0.5 mg by mouth nightly. Yes Provider, Historical   gabapentin (NEURONTIN) 300 mg capsule Take 300 mg by mouth three (3) times daily. Yes Provider, Historical   topiramate (TOPAMAX) 25 mg tablet Take  by mouth two (2) times a day. Yes Provider, Historical   cyclobenzaprine (FLEXERIL) 10 mg tablet Take 10 mg by mouth three (3) times daily as needed for Muscle Spasm(s). Yes Provider, Historical   albuterol (PROVENTIL HFA, VENTOLIN HFA, PROAIR HFA) 90 mcg/actuation inhaler    Yes Provider, Historical   cholecalciferol (VITAMIN D3) 25 mcg (1,000 unit) cap Take  by mouth daily.    Yes Provider, Historical     Allergies   Allergen Reactions    Codeine Other (comments)     abd cramping    Duloxetine Hcl Other (comments)     Other reaction(s): GI upset    Sertraline Other (comments)     GI upset    Tramadol Other (comments)     Gi distress          Review of Systems:    Constitutional: No fever or chills  Neurologic: No headache  Eyes: No scleral icterus or irritated eyes  Nose: No nasal pain or drainage  Mouth: No oral lesions or sore throat  Cardiac: No palpations or chest pain  Pulmonary: No cough or shortness of breath  Gastrointestinal: No nausea, emesis, diarrhea, or constipation  Genitourinary: No dysuria  Musculoskeletal: No muscle or joint tenderness  Skin: No rashes or lesions  Psychiatric: No anxiety or depressed mood      Objective:     Physical Exam:  Visit Vitals  /64   Pulse 66   Temp 97.5 °F (36.4 °C)   Resp 16   Ht 5' 5.5\" (1.664 m)   Wt 112.9 kg (249 lb)   BMI 40.81 kg/m²     General: No acute distress, conversant  Eyes: PERRLA, no scleral icterus  HENT: Normocephalic without oral lesions  Neck: Trachea midline without LAD  Cardiac: Normal pulse rate and rhythm  Pulmonary: Symmetric chest rise with normal effort  GI: Soft, NT, ND, no hernia, no splenomegaly  Skin: Warm without rash  Extremities: No edema or joint stiffness  Psych: Appropriate mood and affect    Assessment:     History of gastric bypass  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 Preoperative upper GI/EGD reviewed. The patient elects to proceed with revision of gastric bypass ([address] [place] [address]). We have reviewed the bariatric risk calculator and they understand the risks of surgery for their individual risk factors.     We have discussed the possible complications of bariatric surgery which include but are not limited to failed weight loss, weight regain, malnutrition, leak, bleed, stricture, gastric ulcer, gastric fistula, gastric bleed, esophageal injury, gallstones, new or worsening gastric reflux, nausea, emesis, internal hernia, abdominal wall hernia, gastric perforation, need for revision / conversion / or reversal, pregnancy complications and loss, intestinal ischemia, post operative skin complications, possible thinning of their hair, bowel obstruction, dumping syndrome, wound infection, blood clots (DVT, mesenteric thrombus, pulmonary embolism), increased addictive tendency, risk of anesthesia, MI, stroke, and death. They expressed complete understanding and had no further questions. The patient understands this is a life altering decision and will require compliance to the program for the remainder of their life in order to be monitored to avoid complication and ensure successful, sustained weight control. They will be placed on a lifelong low carbohydrate and low sugar diet, exercise, and vitamin regimen and will require frequent blood draws and office visits to ensure adequate nutrition and program compliance. Visits and follow up will be in compliance with the guidelines set forth by [facility]. I have specifically mentioned the need to avoid all personal and second-hand tobacco exposure, systemic steroids, and NSAIDS after any bariatric surgery to help avoid the above listed complications. We will proceed with starting the surgical program for anticipated revision.  Advised starting bariatric multivitamin and prescribed vitamin D replacement therapy      Signed By: Roderick Cortes MD [facility]  Bariatric and General Surgeon  [facility]    October 4, 2022

## 2022-10-20 ENCOUNTER — HOSPITAL ENCOUNTER (OUTPATIENT)
Dept: BARIATRICS/WEIGHT MGMT | Age: 52
Discharge: HOME | End: 2022-10-20

## 2022-10-20 ENCOUNTER — DOCUMENTATION ONLY (OUTPATIENT)
Dept: BARIATRICS/WEIGHT MGMT | Age: 52
End: 2022-10-20

## 2022-10-20 NOTE — PROGRESS NOTES
[facility] [address], [address]    Patient's Name: Austen Sanchez     YOB: 1970       Insurance:  [facility] ppo            Session: 1 of  4  Surgeon:  Cristal Martin  Date:10/20/2022    Height: 65 inches Weight:    241      Lbs. BMI: 40.1   Pounds Lost since last month: 0               Pounds Gained since last month: 3    Starting Weight: 238   Previous Months Weight: 238  Overall Pounds Lost: 0 Overall Pounds Gained 3  No alcohol or smoking      Class Guidelines    Guidelines are reviewed with patient at the start of every class. 1. Patient understands that weight loss trial classes must be consecutive. Patient understands if they miss a class, it is their responsibility to contact me to reschedule class. I will reach out to patient after their first no show. 2.  Patient understands the expectations that weight maintenance/weight loss is expected during the classes. Failure to demonstrate changes may result in one extra month of weight loss trial, followed by going back to see the surgeon. 3. Patient is also instructed to be doing their labs, blood work, psych visit, support group and any other test that the surgeon has used while they are working on their weight loss trial.  4. Patient is instructed to bring their education binder to all classes. Changes Made Since Last Class: trying to eat less and drink more water    Eating Habits and Behaviors      Today we reviewed key diet principles. We talked about patient following a low calorie/low carbohydrate diet while they are in weight loss trials. To achieve this, patient is encouraged to avoid liquid calories, including alcohol, soda, sweet tea, and fruit juices. Patient can cut carbohydrates by trying to stick to meat and vegetables.   Patient can also eat eggs, cheese, and good fat, while trying to eliminate starches, such as pasta, rice, crackers, chips, popcorn. I also gave a power point that included 19 Ways to Stay on Track with a Healthy Lifestyle. Some of the food-related suggestions included drinking plenty of water or calorie-free beverages prior to their meal.  Patient is encouraged to to fill up on protein first, which is the ultimate fill-me up food. We talked about the importance of eating breakfast and the effects that can happen if one skips meals, which includes eating larger portions, snacking more, and decreasing their metabolism. With the suggestions in the power point, patient will be able to decrease their calories and carbohydrate intake. Patient's dietary habits include: Patient is eating 3 meals per day. I have talked to patient about some lower carbohydrate snack choices that focused more protein. Patient is drinking 24-32 ounces of fluid per day. Fluid intake is make up of: soda,juice, water,de caffeinatd drinks. Physical Activity/Exercise    Comments: We talked about the importance of establishing a work out routine. Patient is currently walking for activity. Goals have been set. Behavior Modification       Comments:   Some of the behavior tips that were included in the power point, include being choosy about night time snacking. Patient was encouraged to make the TV a no eating zone and not eat after 7 pm.  Patient is also encouraged to keep a food journal.      One of the other things we talked about during class is whether or not patient has a support system. Patient has been to a support group.       Goals set by Registered Dietitian:    Do not skip meals      Cassandra Doan RDN  10/20/2022

## 2022-11-21 ENCOUNTER — HOSPITAL ENCOUNTER (OUTPATIENT)
Dept: BARIATRICS/WEIGHT MGMT | Age: 52
Discharge: HOME | End: 2022-11-21

## 2022-11-21 ENCOUNTER — DOCUMENTATION ONLY (OUTPATIENT)
Dept: SURGERY | Age: 52
End: 2022-11-21

## 2022-11-21 NOTE — PROGRESS NOTES
[address] Shelia Loss [address] [facility], [address]    Patient's Name: Sandie Jo   Age: 46 y.o. YOB: 1970   Sex: female      Insurance:  [employer] Choice            Session: 2 of 4  Revision: Yes   Surgeon:  Dr. Juan Encinas    Height: 5' 5.5\"   Weight:    250      Lbs. BMI: 41.6   Pounds Lost since last month: 0                 Pounds Gained since last month: 9    Starting Weight: 240    Previous Months Weight: 241  Overall Pounds Lost: 0   Overall Pounds Gained: 10      Do you smoke? No    Alcohol intake:  Number of drinks at a time:  No  Number of times a week: N/A    Class Guidelines    Guidelines are reviewed with patient at the start of every class. 1. Patient understands that weight loss trial classes must be consecutive. Patient understands if they miss a class, it is their responsibility to contact me to reschedule class. I will reach out to patient after their first no show. 2.  Patient understands the expectations that weight maintenance/weight loss is expected during the classes. Failure to demonstrate changes may result in one extra month of weight loss trial, followed by going back to see the surgeon. 3. Patient is also instructed to be doing their labs, blood work, psych visit, support group and any other test that the surgeon has used while they are working on their weight loss trial.    Other Pertinent Information: Next month's class will be scheduled with Franklyn Valderrama RD at [facility]     Patient has been to a support group meeting. Changes Made Since Last Class: Not really       Dietary Instruction    During today's class we continued to focus on the key diet principles. Patient was instructed to follow a low carbohydrate diet, focusing on meat and vegetables. Patient was instructed to stop liquid calories and aim for 64 ounces of water per day.       In class, I also gave patient a power point on surviving the holidays. Some of the tips included survival tips for parties, including bringing their own low carbohydrate dish to a potluck dinner and surveying the buffet line before they start filling up their plate. Patient was given cooking alternatives, including using Splenda for sugar, substituting applesauce for oil in recipes, and using low fat plain yogurt instead of sour cream in dips. Patient was also encouraged to be mindful of calories in alcohol. Patient's dietary habits include:    - Patient is eating 3 meals per day. Protein and vegetables   I have emphasized the importance of trying to eat within 1 hour of waking and having a cut off time in the evening. Addressed to patient that the focus should be on protein and vegetables. Protein will help to burn more calories and keep them martinez throughout the day. Portions are:  Smaller. I have encouraged patient to use a smaller plate to measure their portions. Fast food: None  Carry out: Not often  Sit down restaurant: Not often  We talked in class about the importance of planning ahead and trying to do more meal prep at home, so intake of eating out can be decreased. Patient is eating carbohydrates: Don't eat it at all now. Maybe just saltine crackers with medications. Patient was instructed to cut out starches and keep under 75 grams of carbohydrates per day. Reviewed what portions of carbohydrates are  Patient is snacking on string cheese, fruits. This is being done: Twice a day. Small amounts of nuts, apple slices, or something. I have talked to patient about some lower carbohydrate snack choices that focused more protein. Patient's sweet intake is: None. We talked about label reading and cutting out simple sugar. Fluid intake is make up of: Water. No soda, sweet tea, fruit juices, and caffeine. Taking 30 minutes to eat.        Physical Activity/Exercise    Patient is currently doing treadmill for activity. Today's power point on surviving the holidays also included tips on exercising. This included being creative during the holiday, walking stairs, mall walking, getting resistance bands. Patient was encouraged not to be afraid to excuse themselves from the table to go for a walk after they eat. Behavior Modification    Reinforced behavior changes to make. Patient was encouraged to keep their emotions in check. Try to HALT and focus on whether they are eating out of hunger or if they are eating out of emotions. Other eating behaviors included surveying the buffet line before starting to fill up their plate. Patient was given a check off list and encouraged to monitor some of their eating behaviors, such as eating slowly, chewing their food thoroughly, and taking 20-30 minutes to eat a meal.        Non-Scale that patient set for next month include: Body fat [decrease]  Eating more healthier       Lenin Jane, [facility]  11/21/2022

## 2022-11-23 ENCOUNTER — TELEPHONE (OUTPATIENT)
Dept: SURGERY | Age: 52
End: 2022-11-23

## 2022-11-23 NOTE — TELEPHONE ENCOUNTER
Spoke to patient and let her know that I would pass on to Tod Sanford that she was requesting a zoom appointment for Dec., due to the fact that [place] on [place] [id number]. Explained that patient would be hearing from [place] regarding the link once she returns to the office.

## 2022-11-23 NOTE — TELEPHONE ENCOUNTER
Pt called to get clarification on where dietician class on 12/19 will be held at. Pt states that she lives in the [facility] and [place] is too far for her to travel to. Pt is requesting to speak with someone to get clarification.

## 2022-12-07 ENCOUNTER — OFFICE VISIT (OUTPATIENT)
Dept: SURGERY | Age: 52
End: 2022-12-07
Payer: MEDICARE

## 2022-12-07 VITALS
HEIGHT: 66 IN | BODY MASS INDEX: 39.7 KG/M2 | DIASTOLIC BLOOD PRESSURE: 68 MMHG | TEMPERATURE: 96 F | WEIGHT: 247 LBS | SYSTOLIC BLOOD PRESSURE: 129 MMHG | OXYGEN SATURATION: 100 %

## 2022-12-07 DIAGNOSIS — R79.89 HIGH SERUM PARATHYROID HORMONE (PTH): ICD-10-CM

## 2022-12-07 DIAGNOSIS — Z71.89 ENCOUNTER FOR PRE-BARIATRIC SURGERY COUNSELING AND EDUCATION: Primary | ICD-10-CM

## 2022-12-07 DIAGNOSIS — E66.01 MORBID OBESITY (HCC): ICD-10-CM

## 2022-12-07 DIAGNOSIS — Z98.84 HISTORY OF GASTRIC BYPASS: ICD-10-CM

## 2022-12-07 PROCEDURE — 99214 OFFICE O/P EST MOD 30 MIN: CPT | Performed by: REGISTERED NURSE

## 2022-12-07 NOTE — PROGRESS NOTES
Bariatric Preoperative Progress Note    Chief Complaint   Patient presents with    Follow-up     Mid trial for revision      Subjective:     Christopher Yates is a 46 y.o. female who presents today for follow up of her candidacy for bariatric surgery. Since last seen, Christopher Yates has been working through the bariatric program towards revision of gastric bypass with Dr Akhil Marin. Consult weight: 240 lbs  Today's weight: 247 lbs, home scale 243 lbs     Past Medical History:   Diagnosis Date    Anemia     Arthritis     Asthma     Frequency of urination     Lumbago with sciatica     Morbid obesity (HCC)     Nocturia     OAB (overactive bladder)     Recurrent UTI     Tendonitis     Urinary urgency     Vitamin B12 deficiency        Past Surgical History:   Procedure Laterality Date    HX ABDOMINOPLASTY      HX  SECTION      x3. HX CHOLECYSTECTOMY      lap    HX COLONOSCOPY      HX GASTRIC BYPASS      open    HX HYSTERECTOMY      HX HYSTERECTOMY  2013    vaginal       Current Outpatient Medications   Medication Sig Dispense Refill    calcium-vitamin D (OS-CHUNG +D3) 500 mg-200 unit per tablet Take 1 Tablet by mouth daily. folic acid-vit L6-SZX Z75 (FOLBEE, AV-ALAINA, VIRT-AALINA) 2.5-25-1 mg tablet Take 1 Tablet by mouth in the morning. famotidine (PEPCID) 40 mg tablet Take 40 mg by mouth in the morning. rOPINIRole (REQUIP) 0.5 mg tablet Take 0.5 mg by mouth nightly.      gabapentin (NEURONTIN) 300 mg capsule Take 300 mg by mouth three (3) times daily. topiramate (TOPAMAX) 25 mg tablet Take  by mouth two (2) times a day. cyclobenzaprine (FLEXERIL) 10 mg tablet Take 10 mg by mouth three (3) times daily as needed for Muscle Spasm(s). albuterol (PROVENTIL HFA, VENTOLIN HFA, PROAIR HFA) 90 mcg/actuation inhaler       cholecalciferol (VITAMIN D3) 25 mcg (1,000 unit) cap Take  by mouth daily.          Allergies   Allergen Reactions    Codeine Other (comments)     abd cramping Duloxetine Hcl Other (comments)     Other reaction(s): GI upset    Sertraline Other (comments)     GI upset    Tramadol Other (comments)     Gi distress         ROS:  Review of Systems   Constitutional:  Positive for weight loss. Negative for malaise/fatigue. Eyes:  Negative for blurred vision. Respiratory:  Negative for cough and shortness of breath. Cardiovascular:  Negative for chest pain and palpitations. Gastrointestinal:  Negative for abdominal pain. Musculoskeletal:  Negative for joint pain. Neurological:  Negative for dizziness and headaches. Objective:     Physical Exam:  Visit Vitals  /68 (BP 1 Location: Right upper arm, BP Patient Position: Sitting, BP Cuff Size: Large adult)   Temp (!) 96 °F (35.6 °C) (Temporal)   Ht 5' 5.5\" (1.664 m)   Wt 112 kg (247 lb)   SpO2 100%   BMI 40.48 kg/m²       Physical Exam  Vitals and nursing note reviewed. Constitutional:       Appearance: She is obese. HENT:      Head: Normocephalic and atraumatic. Eyes:      Pupils: Pupils are equal, round, and reactive to light. Cardiovascular:      Rate and Rhythm: Normal rate and regular rhythm. Pulmonary:      Effort: Pulmonary effort is normal.      Breath sounds: Normal breath sounds. Musculoskeletal:         General: Normal range of motion. Neurological:      Mental Status: She is alert and oriented to person, place, and time. Psychiatric:         Mood and Affect: Mood normal.         Behavior: Behavior normal.         Thought Content: Thought content normal.       Studies to date and results:    Labs: 8/9/2022  .88: Has a follow up with PCP. PTH ordered. HgB 11.9: Follow by hematology for JAGRUTI  Vit D 19.0: Taking ProCare and cholecalciferol 50,000 unit cap weekly. Tolerating therapy without issues.      EKG: Appt on 12/8/2022    CXR: Order provided    EGD: 9/16/2022 with Dr. Johnna Arvizu    UGI: 8/23/2022    Nutritional evaluation: Completed 2 of 4     Psychiatric evaluation: Appt on 12/14/2022    Support Group: Done    Additional evaluations:  PCP clearance  Cardiac clearance 12/8/2022    Assessment:   Edy Syed is a 46 y.o. female who is progressing through the bariatric preoperative evaluation. At this time, she is an appropriate candidate for weight loss surgery pending completion of requirements. Plan:   -Complete remainder of preop evaluation including WLT classes, PCP clearance, psychiatric evaluation, cardiac clearance, EKG, CXR.  -Patient communicates understanding that the expectation is to lose or maintain weight during WLT. Weight gain may result in delay or cancellation of surgery. Will follow up with RD. Avoid oatmeal, raisin bran, and crackers.   -Follow up once she has completed entirety of weight loss workup to determine next steps.         Rajeev Velásquez NP

## 2022-12-07 NOTE — PROGRESS NOTES
Jorje Abdi is a 46 y.o. female (: 1970) presenting to address:    Chief Complaint   Patient presents with    Follow-up     Mid trial for revision       Medication list and allergies have been reviewed with Robsondeedee Trinidad and updated as of today's date. I have gone over all Medical, Surgical and Social History with Jorje Abdi and updated/added the information accordingly. 1. Have you been to the ER, Urgent Care or Hospitalized since your last visit? NO    2. Have you followed up with your PCP or any other Physicians since your procedure/ last office visit?    NO

## 2022-12-19 ENCOUNTER — HOSPITAL ENCOUNTER (OUTPATIENT)
Dept: BARIATRICS/WEIGHT MGMT | Age: 52
Discharge: HOME | End: 2022-12-19

## 2022-12-19 ENCOUNTER — DOCUMENTATION ONLY (OUTPATIENT)
Dept: BARIATRICS/WEIGHT MGMT | Age: 52
End: 2022-12-19

## 2022-12-19 NOTE — PROGRESS NOTES
[address] Shelia Loss [address] [facility], [address]    Patient's Name: Edy Syed   Age: 46 y.o. YOB: 1970   Sex: female        Session: 3 of 4    Height: 5 f 5.5   Weight:    247      Lbs. BMI:    Pounds Lost since last month: 3                Pounds Gained since last month: 0    Starting Weight: 240     Previous Months Weight: 250  Overall Pounds Lost: 0   Overall Pounds Gained: 7      Do you smoke? None    Alcohol intake:  Number of drinks at a time:  None  Number of times a week: None    Class Guidelines    Guidelines are reviewed with patient at the start of every class. 1. Patient understands that weight loss trial classes must be consecutive. Patient understands if they miss a class, it is their responsibility to contact me to reschedule class. I will reach out to patient after their first no show. 2.  Patient understands the expectations that weight maintenance/weight loss is expected during the classes. Failure to demonstrate changes may result in one extra month of weight loss trial, followed by going back to see the surgeon. 3. Patient is also instructed to be doing their labs, blood work, psych visit, support group and any other test that the surgeon has used while they are working on their weight loss trial.    Other Pertinent Information:     Patient has attended support group. Changes Made Since Last Class:     Eating Habits and Behaviors      Today we reviewed key diet principles. We talked about protein drinks and ones that would be okay. Patient was encouraged to start getting into a routine now and drinking a shake. Patient may use for a meal replacement or a snack. Patient was also encouraged to stop liquid calories. We talked about fluid choices that would be okay. We also spent a lot of time talking about carbohydrates.   Patient was encouraged to start cutting their carbohydrates out and keep them less than 75 grams per day. Patient was given examples of carbohydrates that are in food. We also talked about the power of protein and the importance of getting more protein in per day than carbohydrates. I have reviewed with patient meal and snack ideas that focus on protein first.    I also reviewed with patient the vitamins that they will need to take post op. Patient will hear this information again at pre op class prior to surgery, but I felt it was important to prepare them now. Patient will be taking 2 multivitamin complete per day, 100 mg of Vitamin B1, 5000 IU of Vitamin D3, 1000 mcg Vitamin B12, 1500 mg of calcium citrate. We also spent some time talking about the post op diet protocol. Patient is aware they will be on a liquid diet before surgery and then a week of liquids after surgery followed by 5 weeks of soft protein. Patient's diet habits are: 3 meals a day. Protein and vegetables only. Using a smaller plate. Snacking on string cheese. No sweets. Only drinking water. Physical Activity/Exercise    Comments:     Currently for exercise, patient is starting to walk the treadmill more than she was. We talked about activities for patient to do, including walking, swimming, or chair exercises. Goals have been set. Behavior Modification       Comments:  Emphasized the importance of eating slowly, not eating and drinking meals at the same time. I have also encouraged patient to work on food journaling  We talked about ways they can track their daily intake. Goals that patient set for next month include: Lose weight and be healthy.     Odalys Christensen, MS RD  12/23/22

## 2023-01-20 ENCOUNTER — HOSPITAL ENCOUNTER (OUTPATIENT)
Dept: BARIATRICS/WEIGHT MGMT | Age: 53
Discharge: HOME | End: 2023-01-20

## 2023-01-20 ENCOUNTER — DOCUMENTATION ONLY (OUTPATIENT)
Dept: BARIATRICS/WEIGHT MGMT | Age: 53
End: 2023-01-20

## 2023-01-20 NOTE — PROGRESS NOTES
Nutrition Evaluation    Patient's Name: Hugo Alfaro   Age: 46 y.o. YOB: 1970   Sex: female    Height: 5 f 5.5  Weight: 239 BMI:  39.2  Starting Weight:  240        Smoking Status:  None  Alcohol Intake:  Number of Drinks at a Time: None  Number of Times a Week: None    Changes made during classes include:  Walking on treadmill  Stopped liquid calories  Less sugar      Summary:  I feel that Hugo Alfaro has demonstrated appropriate diet changes and is ready to move forward with surgery. Patient has been briefed on the importance of the protein drinks, vitamins, and the transition of the diet stages. Patient understands that the long-term diet will focus on protein and vegetables. Patient understand the effects of carbohydrates after surgery and what reactive hypoglycemia is. Patient is aware that they will be attending pre-op class 2 weeks before surgery and will get more detailed information on the post-op diet guidelines. Patient will see me again at 6 weeks post-op. At this 6 week visit, RD will assess how patient is tolerating soft protein and advance to vegetables, if tolerating soft protein without difficulty. Patient will also see RD again at 9 months post-op. This visit will assess patient's compliance with current protocol, including diet, vitamins, protein shakes, and exercise. Post-op diet guidelines will be reinforced. RD is available for questions and to meet with patient outside of the 6 week and 9 month post-op visit. We spent a lot of time talking about the vitamins. Patient understands the importance of being compliant with the diet protocol and the complications and risks that can occur if they are non-compliant with the nutritional protocol. Patient has attended at least one support group.     Candidate for surgery: Yes  Re-evaluation Date:     Procedure:  Gastric Bypass     Yunier Oakes 87 RD  1/20/2023

## 2023-01-20 NOTE — PROGRESS NOTES
[address] Shelia Loss [address] [facility], [address]    Patient's Name: Tula Schilder   Age: 46 y.o. YOB: 1970   Sex: female    Date:   1/20/2023        Session: 4 of 4  Revision:     Surgeon:  Dr. Estiven Baum    Height: 5 f 5.5   Weight:    239      Lbs. BMI: 39.2   Pounds Lost since last month: 8                 Pounds Gained since last month: 0    Starting Weight: 240     Previous Months Weight: 247  Overall Pounds Lost: 1   Overall Pounds Gained: 0    Do you smoke? None    Alcohol intake:  Number of drinks at a time:  None  Number of times a week: None    Class Guidelines    Guidelines are reviewed with patient at the start of every class. 1. Patient understands that weight loss trial classes must be consecutive. Patient understands if they miss a class, it is their responsibility to contact me to reschedule class. I will reach out to patient after their first no show. 2.  Patient understands the expectations that weight maintenance/weight loss is expected during the classes. Failure to demonstrate changes may result in one extra month of weight loss trial, followed by going back to see the surgeon. 3. Patient is also instructed to be doing their labs, blood work, psych visit, support group and any other test that the surgeon has used while they are working on their weight loss trial.    Other Pertinent Information:     Patient has attended a support group meeting. Changes Made Since Last Class: States she has made changes and is losing weight    Eating Habits and Behaviors      Today we reviewed key diet principles. We talked about snack ideas that would focus more on protein. We also talked about the benefits of filling up on protein first and keeping the daily carbohydrate intake to less than 75 grams per day.   Patient was instructed to increase fluid intake to 64 ounces per day and stop all carbonation, caffeine, and sugary drinks. During class, we talked about the importance of getting on a routine of eating 3 meals a day, eating within one hour of waking up, and not going longer than 4 hours without fueling the body again. I also talked with patient about some meal ideas. Patient is currently eating 3 meals per day. Meals focus on protein, vegetables, and carbohydrates. Patient is encouraged to work on cutting starches out. Patient is encouraged to continue to work on getting 64 ounces of fluid per day. Patient is currently drinking only drinking water. Patient has been instructed to stop all liquid calories. We talked about snacks in class that are protein based and cutting out the empty carbohydrates. Patient was also given ideas of different protein shake that could be used as a snack or meal replacement. Physical Activity/Exercise    Comments:     Currently for exercise, patient is walking on the treadmill and walking at work. We talked about activities for patient to do, including walking, swimming, or chair exercises. Goals have been set. Behavior Modification       Comments:   During class, I reviewed a power point with patients called, \"Assessing Your Readiness to Change. \"  During this power point, patient was asked to self-evaluate themselves. At the end, we tallied the scores to determine how ready they are to make changes for the surgery. For the New Year's, I had patient set New Year's resolutions, including a food-related goal, exercise-related goal, and behavior goal.  Patient was encouraged to track the goals on a daily basis using the check off list I provided. Goals should be SMART, specific, measurable, attainable, realistic, and time-orientated. Patient's Goals are:  Food-related goal: To continue to stay away from fried foods. As to chicken,pork chops. And other fried foods that can't be air fried or boil.   Exercise-related  To continue walking on my threadmill and using my exercise bike. Hoping to continue to use it every day while reaching my goal and afterwards as well. Me losing weight is showing me that I can have a positive altitude and let's me know that I can do it. Behavior-related goal: Is to continue to stay away from [place] [id number] and dine in restaurants.         Yunier Warner Pollo 87 RD  1/20/2023

## 2023-01-31 ENCOUNTER — OFFICE VISIT (OUTPATIENT)
Dept: SURGERY | Age: 53
End: 2023-01-31
Payer: MEDICARE

## 2023-01-31 VITALS
DIASTOLIC BLOOD PRESSURE: 64 MMHG | HEART RATE: 64 BPM | HEIGHT: 65 IN | RESPIRATION RATE: 18 BRPM | BODY MASS INDEX: 40.15 KG/M2 | OXYGEN SATURATION: 100 % | TEMPERATURE: 97.2 F | WEIGHT: 241 LBS | SYSTOLIC BLOOD PRESSURE: 116 MMHG

## 2023-01-31 DIAGNOSIS — E66.01 MORBID OBESITY DUE TO EXCESS CALORIES (HCC): Primary | ICD-10-CM

## 2023-01-31 DIAGNOSIS — Z01.818 PREOP EXAMINATION: ICD-10-CM

## 2023-01-31 DIAGNOSIS — G47.33 OBSTRUCTIVE SLEEP APNEA SYNDROME: ICD-10-CM

## 2023-01-31 DIAGNOSIS — Z98.84 HISTORY OF GASTRIC BYPASS: ICD-10-CM

## 2023-01-31 DIAGNOSIS — K91.2 POSTSURGICAL MALABSORPTION: ICD-10-CM

## 2023-01-31 DIAGNOSIS — K21.9 GASTROESOPHAGEAL REFLUX DISEASE WITHOUT ESOPHAGITIS: ICD-10-CM

## 2023-01-31 PROCEDURE — G8427 DOCREV CUR MEDS BY ELIG CLIN: HCPCS | Performed by: SURGERY

## 2023-01-31 PROCEDURE — 99213 OFFICE O/P EST LOW 20 MIN: CPT | Performed by: SURGERY

## 2023-01-31 PROCEDURE — 3017F COLORECTAL CA SCREEN DOC REV: CPT | Performed by: SURGERY

## 2023-01-31 PROCEDURE — G8417 CALC BMI ABV UP PARAM F/U: HCPCS | Performed by: SURGERY

## 2023-01-31 PROCEDURE — G8432 DEP SCR NOT DOC, RNG: HCPCS | Performed by: SURGERY

## 2023-01-31 RX ORDER — ERGOCALCIFEROL 1.25 MG/1
CAPSULE ORAL
COMMUNITY
Start: 2022-12-12

## 2023-01-31 RX ORDER — MECLIZINE HCL 12.5 MG 12.5 MG/1
TABLET ORAL
COMMUNITY
Start: 2023-01-09

## 2023-01-31 RX ORDER — FLUTICASONE PROPIONATE 50 MCG
SPRAY, SUSPENSION (ML) NASAL
COMMUNITY
Start: 2023-01-09

## 2023-01-31 NOTE — PROGRESS NOTES
Chief Complaint   Patient presents with    Follow-up     Bariatric program had gbp 1999    1. Have you been to the ER, urgent care clinic since your last visit? Hospitalized since your last visit? No    2. Have you seen or consulted any other health care providers outside of the [address] [place] [address] since your last visit? Include any pap smears or colon screening. No  Pt ID confirmed    Weight Loss Metrics 1/31/2023 1/31/2023 12/7/2022 10/4/2022 9/16/2022 8/2/2022 8/2/2022   Pre op / Initial Wt 241 - - - - 240 -   Today's Wt - 241 lb 247 lb 249 lb 242 lb - 240 lb   BMI - 40.1 kg/m2 40.48 kg/m2 40.81 kg/m2 40.27 kg/m2 - 39.33 kg/m2   Ideal Body Wt 134 - - - - 137 -   Excess Body Wt 107 - - - - 103 -   Goal Wt 155 - - - - 157.6 -   Wt loss to date 0 - - - - 0 -   % Wt Loss 0 - - - - 0 -   80% EBW 85.6 - - - - 82.4 -       Body mass index is 40.1 kg/m².

## 2023-01-31 NOTE — PROGRESS NOTES
Bariatric Surgery Progress Note    CC: Preop appointment for bariatric surgery  Subjective:     Patient presents for a preop appointment for bariatric surgery having completed the insurance-mandated and clinically-relevant clearances/counseling. REVIEW:    Finished nutrition sessions, cleared by RD    PCP clearance in chart    Cardiac risk stratification/optimization in chart, along with unconcerning stress test    Psych clearance in chart, cleared by Dr. Gregor Adorno reviewed:  Vitamin D deficiency  Elevated .9, down to 77 after initiation of vitamin D replacement and calcium supplementation  H pylori negative  Thiamine normal  Iron studies normal  Hgb 11.9, minimal anemia  TSH normal  Folate, B12 normal    UGI outside report (images not available): \"Focal neostomach contrast filled outpouching, tertiary nonpropulsive contractions of the esophagus, LANDON    EGD  Findings:   3x4cm gastric pouch  3cm gastrojejunal anastomosis (dilated gastrojejunostomy, technical failure of gastric bypass)  5cm blind end of jaci limb (candy cane, this must be the \"focal neostomach contrast filled outpouching\" seen on the UGI)  Normal jaci limb mucosa  Unable to retroflex in pouch due to small pouch, possible laxity at hiatus    Has started ProCare Chewable with iron, vitamin D daily and a weekly vitamin D 50,000IU dose, as well as calcium citrate TID.     Previous relevant surgeries: open gastric bypass, lap anel, vaginal hysterectomy, abdominoplasty  Patient Active Problem List    Diagnosis Date Noted    Morbid obesity due to excess calories ([facility] [address] 08/02/2022    History of gastric bypass 08/02/2022    Postsurgical malabsorption 08/02/2022    Gastroesophageal reflux disease without esophagitis 03/22/2021    Anemia, deficiency 11/09/2020    Vitamin D deficiency 11/09/2020    Obstructive sleep apnea syndrome 07/21/2020    Restless leg syndrome 07/21/2020    Polyneuropathy 05/16/2018    Lymphedema 10/19/2017    Bilateral leg edema 2017    Peripheral arterial occlusive disease ([facility] [address] 2016      Past Medical History:   Diagnosis Date    Anemia     Arthritis     Asthma     Frequency of urination     Lumbago with sciatica     Morbid obesity (HCC)     Nocturia     OAB (overactive bladder)     Recurrent UTI     Tendonitis     Urinary urgency     Vitamin B12 deficiency      Past Surgical History:   Procedure Laterality Date    HX ABDOMINOPLASTY      HX  SECTION      x3. HX CHOLECYSTECTOMY      lap    HX COLONOSCOPY      HX GASTRIC BYPASS  1999    open    HX HYSTERECTOMY      HX HYSTERECTOMY  2013    vaginal      Social History     Tobacco Use    Smoking status: Never    Smokeless tobacco: Never   Substance Use Topics    Alcohol use: No      History reviewed. No pertinent family history. Prior to Admission medications    Medication Sig Start Date End Date Taking? Authorizing Provider   ergocalciferol (ERGOCALCIFEROL) 1,250 mcg (50,000 unit) capsule TAKE 1 CAPSULE BY MOUTH WEEKLY FOR 90 DAYS 22  Yes Provider, Historical   fluticasone propionate (FLONASE) 50 mcg/actuation nasal spray 1 SPRAY IN EACH NOSTRIL ONCE A DAY 30 DAYS 23  Yes Provider, Historical   meclizine (ANTIVERT) 12.5 mg tablet TAKE 1 TAB ORALLY EVERY 8 HOURS AS NEEDED VERTIGO 10 DAYS 23  Yes Provider, Historical   calcium-vitamin D (OS-CHUNG +D3) 500 mg-200 unit per tablet Take 1 Tablet by mouth daily. Yes Provider, Historical   folic acid-vit [id number] P32 (FOLBEE, AV-ALAINA, VIRT-ALAINA) 2.5-25-1 mg tablet Take 1 Tablet by mouth in the morning. Yes Provider, Historical   famotidine (PEPCID) 40 mg tablet Take 40 mg by mouth in the morning. 10/5/21  Yes Provider, Historical   rOPINIRole (REQUIP) 0.5 mg tablet Take 0.5 mg by mouth nightly. Yes Provider, Historical   gabapentin (NEURONTIN) 300 mg capsule Take 300 mg by mouth three (3) times daily.    Yes Provider, Historical   topiramate (TOPAMAX) 25 mg tablet Take  by mouth two (2) times a day.   Yes Provider, Historical   cyclobenzaprine (FLEXERIL) 10 mg tablet Take 10 mg by mouth three (3) times daily as needed for Muscle Spasm(s). Yes Provider, Historical   albuterol (PROVENTIL HFA, VENTOLIN HFA, PROAIR HFA) 90 mcg/actuation inhaler    Yes Provider, Historical   cholecalciferol (VITAMIN D3) 25 mcg (1,000 unit) cap Take  by mouth daily. Yes Provider, Historical     Allergies   Allergen Reactions    Codeine Other (comments)     abd cramping    Duloxetine Hcl Other (comments)     Other reaction(s): GI upset    Sertraline Other (comments)     GI upset    Tramadol Other (comments)     Gi distress          Review of Systems:    Constitutional: No fever or chills  Neurologic: No headache  Eyes: No scleral icterus or irritated eyes  Nose: No nasal pain or drainage  Mouth: No oral lesions or sore throat  Cardiac: No palpations or chest pain  Pulmonary: No cough or shortness of breath  Gastrointestinal: No nausea, emesis, diarrhea, or constipation  Genitourinary: No dysuria  Musculoskeletal: No muscle or joint tenderness  Skin: No rashes or lesions  Psychiatric: No anxiety or depressed mood      Objective:     Physical Exam:  Visit Vitals  /64   Pulse 64   Temp 97.2 °F (36.2 °C)   Resp 18   Ht 5' 5\" (1.651 m)   Wt 109.3 kg (241 lb)   SpO2 100%   BMI 40.10 kg/m²     General: No acute distress, conversant  Eyes: PERRLA, no scleral icterus  HENT: Normocephalic without oral lesions  Neck: Trachea midline without LAD  Cardiac: Normal pulse rate and rhythm  Pulmonary: Symmetric chest rise with normal effort  GI: Soft, NT, ND, no hernia, no splenomegaly  Skin: Warm without rash  Extremities: No edema or joint stiffness  Psych: Appropriate mood and affect    Assessment:     Morbid obesity with comorbidities  Plan:   The patient and I had further discussion after their successful supervised weight loss, medical, dietary, and psychiatric clearance. Preoperative upper GI/EGD reviewed.  The patient elects to proceed with revision of gastric bypass (laparoscopic, possible endoscopic). We have reviewed the bariatric risk calculator and they understand the risks of surgery for their individual risk factors. At this point they are cleared for surgery from my point of view and will be submitted for insurance approval.    Patient will be consented for: laparoscopic, possible endoscopic revision of gastric bypass (last case of day). We have discussed the possible complications of bariatric surgery which include but are not limited to failed weight loss, weight regain, malnutrition, leak, bleed, stricture, gastric ulcer, gastric fistula, gastric bleed, esophageal injury, gallstones, new or worsening gastric reflux, nausea, emesis, internal hernia, abdominal wall hernia, gastric perforation, need for revision / conversion / or reversal, pregnancy complications and loss, intestinal ischemia, post operative skin complications, possible thinning of their hair, bowel obstruction, dumping syndrome, wound infection, blood clots (DVT, mesenteric thrombus, pulmonary embolism), increased addictive tendency, risk of anesthesia, MI, stroke, and death. They expressed complete understanding and had no further questions. The patient understands this is a life altering decision and will require compliance to the program for the remainder of their life in order to be monitored to avoid complication and ensure successful, sustained weight control. They will be placed on a lifelong low carbohydrate and low sugar diet, exercise, and vitamin regimen and will require frequent blood draws and office visits to ensure adequate nutrition and program compliance. Visits and follow up will be in compliance with the guidelines set forth by [facility]. I have specifically mentioned the need to avoid all personal and second-hand tobacco exposure, systemic steroids, and NSAIDS after any bariatric surgery to help avoid the above listed complications. The patient has expressed understanding of the above and would like to proceed with surgery.       Signed By: Radha Rodrigues MD [facility]  Bariatric and General Surgeon  [facility]    January 31, 2023

## 2023-01-31 NOTE — PATIENT INSTRUCTIONS
Take the following medications as noted. - If the medication is circled, take with a sip of water on the morning of surgery prior to reporting to the hospital  - If the medication has a line drawn through it, do not take that medication after starting your liquid diet before surgery  - If the medication has a star beside it, change its dosing as written beside it on the date written beside it. Current Outpatient Medications   Medication Sig Dispense Refill    ergocalciferol (ERGOCALCIFEROL) 1,250 mcg (50,000 unit) capsule TAKE 1 CAPSULE BY MOUTH WEEKLY FOR 90 DAYS      fluticasone propionate (FLONASE) 50 mcg/actuation nasal spray 1 SPRAY IN EACH NOSTRIL ONCE A DAY 30 DAYS      meclizine (ANTIVERT) 12.5 mg tablet TAKE 1 TAB ORALLY EVERY 8 HOURS AS NEEDED VERTIGO 10 DAYS      calcium-vitamin D (OS-CHUNG +D3) 500 mg-200 unit per tablet Take 1 Tablet by mouth daily. folic acid-vit Q8-UXV I74 (FOLBEE, AV-ALAINA, VIRT-ALAINA) 2.5-25-1 mg tablet Take 1 Tablet by mouth in the morning. famotidine (PEPCID) 40 mg tablet Take 40 mg by mouth in the morning. rOPINIRole (REQUIP) 0.5 mg tablet Take 0.5 mg by mouth nightly.      gabapentin (NEURONTIN) 300 mg capsule Take 300 mg by mouth three (3) times daily. topiramate (TOPAMAX) 25 mg tablet Take  by mouth two (2) times a day. cyclobenzaprine (FLEXERIL) 10 mg tablet Take 10 mg by mouth three (3) times daily as needed for Muscle Spasm(s). albuterol (PROVENTIL HFA, VENTOLIN HFA, PROAIR HFA) 90 mcg/actuation inhaler       cholecalciferol (VITAMIN D3) 25 mcg (1,000 unit) cap Take  by mouth daily.

## 2023-02-07 ENCOUNTER — DOCUMENTATION ONLY (OUTPATIENT)
Dept: BARIATRICS/WEIGHT MGMT | Age: 53
End: 2023-02-07

## 2023-02-07 NOTE — PROGRESS NOTES
CLINICAL NUTRITION PRE-OPERATIVE EDUCATION    Patient's Name: Lord Light   Age: 46 y.o. YOB: 1970   Sex: female    Education & Materials Provided:   - Soft Protein Diet Shopping List  -  Supplemental Resource Guide: MVI, B12, Calcium Citrate, Vitamin D, Vitamin B1,   and iron recommendations  - Protein Supplement Information  - Fluid Requirements/ No Straws  - No Caffeine or Carbonation   - No alcohol              - No Snacks or No Concentrated Sweets     - Exercising   - Support System at [facility] of Support Group meetings. Support System after surgery includes: x     - Key Diet Principles            - Addressed Current Habits/Changes to Make   - Patient has been educated on the liquid diet to begin 1 week prior to surgery. Patient understands the transition of the diet. Attendance of support group: Yes    Summary:  Patient has completed the required amount of visits with the Registered Dietitian. During these nutrition visits, we focused on dietary changes, behavior changes, and the importance of establishing an exercise routine. The diet protocol that patient was prescribed emphasized on low carbohydrates (less than 100 grams per day prior to surgery and 60-80 grams of protein per day). At today's session, patient was educated on the post-op diet protocol. Patient understands the importance of keeping total fat and sugar less than 3 grams per serving. Patient is aware of the transition of the diet stages and is aware that they will be on clear liquids for 7days, followed by soft protein for 5 weeks. Patient understands the body needs ~ 60-70 grams of protein per day. During the liquid phase, patient will need 60 grams of protein from shakes. Once eating soft protein, patient will only need 1 shake per day. Patient is aware of the importance of the vitamins and protein drinks. We spent a lot of time talking about the vitamins.   Patient understands the importance of being compliant with the diet protocol and the complications and risks that can occur if they are non-compliant with the nutritional protocol and non-compliant with the vitamins. Patient has also been educated on the pre-op liquid diet for 10 days. Patient understands that failure to comply in pre-op liquid diet could result in surgery being canceled. Patient's 6 week post-op nutrition appointment has been scheduled. At this 6 week visit, RD will assess how patient is tolerating soft protein and advance to vegetables, if tolerating soft protein without difficulty. Patient will also see RD again at 9 months post-op. This visit will assess patient's compliance with current protocol, including diet, vitamins, protein shakes, and exercise. Post-op diet guidelines will be reinforced. RD is available for questions and to meet with patient outside of the 6 week and 9 month post-op visit. Ok to proceed.      Candidate for surgery: Yes  Re-evaluation Date:     Yunier Dunaway 87 RD  2/7/2023

## 2023-02-08 ENCOUNTER — HOSPITAL ENCOUNTER (OUTPATIENT)
Dept: BARIATRICS/WEIGHT MGMT | Age: 53
Discharge: HOME | End: 2023-02-08

## 2023-02-08 ENCOUNTER — TELEPHONE (OUTPATIENT)
Dept: BARIATRICS/WEIGHT MGMT | Age: 53
End: 2023-02-08

## 2023-02-08 NOTE — TELEPHONE ENCOUNTER
Gastric Bypass Instruct patient to read and understand how their surgery works. The laparoscopic Cornel-en-Y Gastric Bypass -- often called gastric  bypass -- is considered the gold standard of weight loss surgery. The procedure: The gastric bypass is one of the most frequently performed weight  loss procedures in the [place]. In this procedure, stapling  creates a small (15 to 20 milliliters) stomach pouch. The remainder  of the stomach is not removed but is completely stapled shut and divided from the stomach  pouch. The outlet from this newly formed pouch empties directly into the lower portion of the  small intestine, thus bypassing calorie absorption. This is done by dividing the small intestine just  beyond the duodenum for the purpose of bringing it up and constructing a connection with the  newly formed stomach pouch. The other end is connected into the side of the Cornel limb of the  intestine creating the Y shape that gives the technique its name. The length of either segment of  the intestine can be increased to produce lower or higher levels of malabsorption. Most importantly, the rerouting of the food stream produces changes in gut hormones that  promote feeling of fullness, suppress hunger, and reverse one of the primary mechanisms by which  obesity induces Type 2 diabetes. Advantages: The average excess weight loss after the gastric bypass procedure is generally higher in a  compliant patient than with purely restrictive procedures. One year after surgery, weight loss can average 60 to 80 percent of excess body weight. Studies show that after 10 to 14 years, 50 to 60 percent of excess body weight loss has been  maintained by some patients. A 2000 study of 500 patients showed that 96 percent of associated health conditions (back  pain, sleep apnea, high blood pressure, diabetes and depression) were improved or resolved.   Disadvantages:   Because the duodenum is bypassed, poor absorption of iron and calcium can result in the  lowering of total body iron and a predisposition to iron deficiency anemia. A chronic anemia caused by vitamin B-12 deficiency may occur. This problem usually can be  prevented with vitamin B-12 pills under the tongue or injections. In some cases, the effectiveness of the procedure may be reduced if the stomach pouch is  stretched and/or if it is initially left larger than 15 to 30 cc. The bypassed portion of the stomach, duodenum and segments of the small intestine cannot  be easily visualized using X-ray or endoscopy if there are any problems, such as ulcers,  bleeding or malignancy. Sleeve Gastrectomy  The laparoscopic sleeve gastrectomy -- often called the  sleeve -- is performed by removing approximately 80 percent  of the stomach. The remaining stomach is a tubular pouch that  resembles a banana. The procedure:  During the sleeve gastrectomy procedure, a thin, vertical sleeve  of stomach is created by using a stapling device. The sleeve is about the size of a banana. The rest  of the stomach is removed. By creating a smaller stomach pouch, a sleeve gastrectomy limits the  amount of food that can be eaten at one time so you feel full sooner and stay full longer. As you  eat less food, your body will stop storing excess calories and start using its fat supply for energy. The greater impact, however, seems to be the effect the surgery has on gut hormones that impact  a number of factors including hunger, feeling of fullness, and blood sugar control. Advantages:   Eliminates the portion of the stomach that produces the hormones that stimulate hunger. Minimizes the chance of an ulcer occurring. Is an appealing option for people who are concerned about the complications of intestinal  bypass procedures or who have anemia, Crohns disease or various other conditions that make  intestinal bypass procedures too risky.   -- 13 --  PATIENT GUIDE TO BARIATRIC [facility]  Disadvantages:   Potential for inadequate weight loss or weight regain. While this is true for all procedures, it is  more possible with procedures that do not have an intestinal bypass. Higher BMI patients may need to have a second-stage procedure later to help lose additional  weight. Remember, two stages may ultimately be safer and more effective than one operation  for higher BMI patients. This procedure does involve stomach stapling and therefore, leaks and other complications  related to stapling may occur. This procedure is not reversible. Pre op Appoitments  PE LOCATION PROVIDER  2 Weeks  6-Week Nutrition  3 Months*  6 Months*  1 Year*  Patient Acknowledgement of Risks, Benefits,  and Alternatives to Bariatric Surgical Procedures  Patient Name:_________________________________________________________________  :_ _______________________________________________________________________  I am requesting bariatric surgery be performed on me. I believe I may benefit from bariatric  surgery. This form is designed to ensure that I understand the risks, benefits, and alternatives to  having bariatric surgery. Bariatric surgery, or surgery for morbid obesity, is major surgery. The  options available include restrictive procedures, or those that limit digestive capacity. Examples  include vertical sleeve gastrectomy, or the adjustable gastric band (Lap-Band¢ç/Realize). Malabsorptive procedures, such as distal gastric bypass produce weight loss by decreasing nutrient  absorption. Biliopancreatic diversion with duodenal switch (BPD/DS) and Cornel-en-Y gastric  bypass combine these two processes to varying degrees. While most procedures can be performed  laparoscopically (through multiple small incisions), there is a possibility that an open technique  may be required (through a large incision).  There are alternatives to surgery including medications,  diet and exercise, and behavior modification. I understand that obesity is a chronic disease with no  known cure. I am choosing bariatric surgery as treatment for this disease. Patient initials: ______________  I am informed of the potential benefits from bariatric surgery which may include improvements  in associated comorbidities (ie; diabetes, obstructive sleep apnea, GERD, high blood pressure),  potential weight loss of 50-80 percent excess weight, and general improvement in quality of life. The benefits are not guaranteed, and are dependent upon me making the necessary lifestyle  changes for success. I am aware that some patients may not lose as much weight, or still may  require treatment for medical problems after bariatric surgery. Some patients may gain back some  or all of the weight lost after bariatric surgery. Bariatric surgery is a treatment tool, not a cure for  obesity. Compliance with the dietary and lifestyle recommendations is necessary for maintenance  of lost weight in the long term. For example, I have been taught that it is recommended that  all patients maintain a healthy diet consisting of low-carbohydrate, low-sugar foods rich in lean  protein, and non-starchy vegetables. Regular exercise including aerobic activity and weight  training is encouraged, as well as regular attendance at support group meetings. Patient initials: ______________  -- 23 --  PATIENT GUIDE TO Dorian Rea  Eliminating habits that could be detrimental to my health such as drinking alcohol or smoking  is required for all patients. I am aware that the risks of smoking and alcohol use after bariatric  surgery include anesthesia complications, stomach ulcers, liver diseases and malnutrition.   In addition, research has shown an increase in sensitivity to alcohol particularly after gastric bypass  procedures resulting in rapid increases in blood alcohol levels and possible addiction. Patient initials: ______________  Because bariatric surgery is considered major surgery, there are many potential complications that  could arise. Some of the problems are related to the bariatric procedure itself, while others are  related to anesthesia and operating on the abdomen. I understand the serious potential complications include: deep venous thrombosis/pulmonary  embolism (blood clots in the legs and or lungs); gastrointestinal leak (leakage of digestive contents  into the abdomen); sepsis (serious infection); injury to adjacent organs such as esophagus, spleen,  pancreas, liver, diaphragm (requiring intervention or surgical removal); excessive bleeding; bowel  obstruction (blockage of the intestines); or organ failure. I am informed that these complications  can be life threatening. The overall mortality rate (risk of death) from bariatric surgery is close to  0.1 percent (1/1,000), but can be as high as 0.5 percent (1/200) for some patients. Patient initials: ______________  I understand that complications requiring re-operation may occur, either immediately after initial  surgery, or later in the recovery process. Patient initials: ______________  Other potential complications which I may have include: wound infections or seromas  (fluid collection under skin); hernias (breakdown of tissue holding in abdominal contents);  gastritis or stomach ulcer (inflammation of the stomach); formation of gallstones; formation  of kidney stones or urinary tract infection; or pneumonia. Device related complications such as  foreign body reaction, band slippage, or erosion may occur with the adjustable gastric band  (Lap-Band¢ç/Realize). Patient initials: ______________  -- 21 --  PATIENT GUIDE TO Dorian Rea  I may struggle with food intolerances after bariatric surgery. These may include sugars, fats, and  lactose (milk sugar).  My taste for certain foods may change as well. Dumping Syndrome (reaction  caused by sugar rapidly entering the intestine -- symptoms include: nausea, sweating, weakness,  dizziness, flushing, possible vomiting and/or diarrhea) occurs often after bariatric surgery. Vomiting and changes in bowel habits may occur, and may be the result of eating too fast, taking  too large a bite, inappropriate food choice or not chewing properly. Chronic vomiting may be a  result of stenosis (tightening) in the stomach pouch and intestine, and may require that I have  treatment with endoscopy or surgery. Malabsorption may lead to diarrhea, foul smelling gas and  protein malnutrition. Though rarely, I may require feedings through tubes into the digestive tract  or veins for nourishment. I am aware that in some patients hair loss or thinning may occur in the  rapid weight loss phase. This is usually temporary, but can be permanent in some cases. I have  been taught about the importance of proper hydration after bariatric surgery, and understand that  dehydration is the most common reason for re-admission to the hospital after surgery. Patient initials: ______________  I understand that over time, and especially with forced overeating, the stomach pouch may stretch  (dilate) or the staple lines may break. This can result in weight regain, ulcer formation or both. Patient initials: ______________  As a female undergoing bariatric surgery, I acknowledge that I must prevent pregnancies for at  least 12 to 18 months following surgery. Pregnancy during the rapid weight loss phase can be  dangerous and harmful to both the mother and fetus. Patient initials: ______________  Vitamin and mineral deficiencies can occur after bariatric surgery. I am instructed to take vitamin  and mineral supplements daily for life (including multivitamin, iron, B vitamins, calcium and vitamin  D).  Failure to comply with these recommendations could result in my experiencing weakness,  nerve or brain damage, confusion, fatigue, rashes, anemia, hair loss, bone loss, and mood changes. I agree to have lab work at regular intervals to assess for vitamin deficiencies. I understand that it  is imperative that I receive continued follow up care after my bariatric surgery by my surgeon, my  program, or other clinician experienced in bariatric care. Patient initials: ______________  -- 21 --  PATIENT GUIDE TO Dorian Rea  If I have an adjustable gastric band (Lap-Band¢ç/Realize), needle adjustments (addition/removal  of saline solution) are required for weight loss and to maintain weight loss. Patient initials: ______________  After I lose weight, the skin of my arms, legs, abdomen, neck, and face may become wrinkled,  droop or sag. Rashes and infections may occur in between my skin folds. Cosmetic surgery may  be indicated in some cases. This is not considered medically necessary in most cases and is rarely  covered by insurance. Patient initials: ______________  I understand that psychological changes may occur with weight loss as a result of bariatric surgery,  which can affect relationships with my loved ones. I agree to re-engage with a mental health  provider as needed. Patient initials: ______________  Some patients elect to have revisional bariatric surgery (correcting anatomic defects from a  previous bariatric operation). I am aware that in these cases, all of the previously addressed risks  apply, however they are three to five times more common. Patient initials: ______________  Follow-up appointments are vital. I agree to the following schedule of appointments after surgery:  two to three weeks, three months, six months, 12 months, and then annually for life. Additional  appointments may be necessary. Gastric Band patient follow-up is determined by my need for  adjustments but is at least once per year after the first year.   Patient initials: ______________  -- 25 --  PATIENT GUIDE  Patient Acknowledgement of Risks, Benefits,  and Alternatives to Bariatric Surgical Procedures  Patient Name:_________________________________________________________________  :_ _______________________________________________________________________  I am requesting bariatric surgery be performed on me. I believe I may benefit from bariatric  surgery. This form is designed to ensure that I understand the risks, benefits, and alternatives to  having bariatric surgery. Bariatric surgery, or surgery for morbid obesity, is major surgery. The  options available include restrictive procedures, or those that limit digestive capacity. Examples  include vertical sleeve gastrectomy, or the adjustable gastric band (Lap-Band¢ç/Realize). Malabsorptive procedures, such as distal gastric bypass produce weight loss by decreasing nutrient  absorption. Biliopancreatic diversion with duodenal switch (BPD/DS) and Cornel-en-Y gastric  bypass combine these two processes to varying degrees. While most procedures can be performed  laparoscopically (through multiple small incisions), there is a possibility that an open technique  may be required (through a large incision). There are alternatives to surgery including medications,  diet and exercise, and behavior modification. I understand that obesity is a chronic disease with no  known cure. I am choosing bariatric surgery as treatment for this disease. Patient initials: ______________  I am informed of the potential benefits from bariatric surgery which may include improvements  in associated comorbidities (ie; diabetes, obstructive sleep apnea, GERD, high blood pressure),  potential weight loss of 50-80 percent excess weight, and general improvement in quality of life. The benefits are not guaranteed, and are dependent upon me making the necessary lifestyle  changes for success.  I am aware that some patients may not lose as much weight, or still may  require treatment for medical problems after bariatric surgery. Some patients may gain back some  or all of the weight lost after bariatric surgery. Bariatric surgery is a treatment tool, not a cure for  obesity. Compliance with the dietary and lifestyle recommendations is necessary for maintenance  of lost weight in the long term. For example, I have been taught that it is recommended that  all patients maintain a healthy diet consisting of low-carbohydrate, low-sugar foods rich in lean  protein, and non-starchy vegetables. Regular exercise including aerobic activity and weight  training is encouraged, as well as regular attendance at support group meetings. Patient initials: ______________  -- 23 --  PATIENT GUIDE TO Dorian Rea  Eliminating habits that could be detrimental to my health such as drinking alcohol or smoking  is required for all patients. I am aware that the risks of smoking and alcohol use after bariatric  surgery include anesthesia complications, stomach ulcers, liver diseases and malnutrition. In addition, research has shown an increase in sensitivity to alcohol particularly after gastric bypass  procedures resulting in rapid increases in blood alcohol levels and possible addiction. Patient initials: ______________  Because bariatric surgery is considered major surgery, there are many potential complications that  could arise. Some of the problems are related to the bariatric procedure itself, while others are  related to anesthesia and operating on the abdomen.   I understand the serious potential complications include: deep venous thrombosis/pulmonary  embolism (blood clots in the legs and or lungs); gastrointestinal leak (leakage of digestive contents  into the abdomen); sepsis (serious infection); injury to adjacent organs such as esophagus, spleen,  pancreas, liver, diaphragm (requiring intervention or surgical removal); excessive bleeding; bowel  obstruction (blockage of the intestines); or organ failure. I am informed that these complications  can be life threatening. The overall mortality rate (risk of death) from bariatric surgery is close to  0.1 percent (1/1,000), but can be as high as 0.5 percent (1/200) for some patients. Patient initials: ______________  I understand that complications requiring re-operation may occur, either immediately after initial  surgery, or later in the recovery process. Patient initials: ______________  Other potential complications which I may have include: wound infections or seromas  (fluid collection under skin); hernias (breakdown of tissue holding in abdominal contents);  gastritis or stomach ulcer (inflammation of the stomach); formation of gallstones; formation  of kidney stones or urinary tract infection; or pneumonia. Device related complications such as  foreign body reaction, band slippage, or erosion may occur with the adjustable gastric band  (Lap-Band¢ç/Realize). Patient initials: ______________  -- 21 --  PATIENT GUIDE TO Dorian Rea  I may struggle with food intolerances after bariatric surgery. These may include sugars, fats, and  lactose (milk sugar). My taste for certain foods may change as well. Dumping Syndrome (reaction  caused by sugar rapidly entering the intestine -- symptoms include: nausea, sweating, weakness,  dizziness, flushing, possible vomiting and/or diarrhea) occurs often after bariatric surgery. Vomiting and changes in bowel habits may occur, and may be the result of eating too fast, taking  too large a bite, inappropriate food choice or not chewing properly. Chronic vomiting may be a  result of stenosis (tightening) in the stomach pouch and intestine, and may require that I have  treatment with endoscopy or surgery. Malabsorption may lead to diarrhea, foul smelling gas and  protein malnutrition.  Though rarely, I may require feedings through tubes into the digestive tract  or veins for nourishment. I am aware that in some patients hair loss or thinning may occur in the  rapid weight loss phase. This is usually temporary, but can be permanent in some cases. I have  been taught about the importance of proper hydration after bariatric surgery, and understand that  dehydration is the most common reason for re-admission to the hospital after surgery. Patient initials: ______________  I understand that over time, and especially with forced overeating, the stomach pouch may stretch  (dilate) or the staple lines may break. This can result in weight regain, ulcer formation or both. Patient initials: ______________  As a female undergoing bariatric surgery, I acknowledge that I must prevent pregnancies for at  least 12 to 18 months following surgery. Pregnancy during the rapid weight loss phase can be  dangerous and harmful to both the mother and fetus. Patient initials: ______________  Vitamin and mineral deficiencies can occur after bariatric surgery. I am instructed to take vitamin  and mineral supplements daily for life (including multivitamin, iron, B vitamins, calcium and vitamin  D). Failure to comply with these recommendations could result in my experiencing weakness,  nerve or brain damage, confusion, fatigue, rashes, anemia, hair loss, bone loss, and mood changes. I agree to have lab work at regular intervals to assess for vitamin deficiencies. I understand that it  is imperative that I receive continued follow up care after my bariatric surgery by my surgeon, my  program, or other clinician experienced in bariatric care. If I have an adjustable gastric band (Lap-Band¢ç/Realize), needle adjustments (addition/removal  of saline solution) are required for weight loss and to maintain weight loss.   Patient initials: ______________  After I lose weight, the skin of my arms, legs, abdomen, neck, and face may become wrinkled,  droop or sag. Rashes and infections may occur in between my skin folds. Cosmetic surgery may  be indicated in some cases. This is not considered medically necessary in most cases and is rarely  covered by insurance. Patient initials: ______________  I understand that psychological changes may occur with weight loss as a result of bariatric surgery,  which can affect relationships with my loved ones. I agree to re-engage with a mental health  provider as needed. Patient initials: ______________  Some patients elect to have revisional bariatric surgery (correcting anatomic defects from a  previous bariatric operation). I am aware that in these cases, all of the previously addressed risks  apply, however they are three to five times more common. Patient initials: ______________  Follow-up appointments are vital. I agree to the following schedule of appointments after surgery:  two to three weeks, three months, six months, 12 months, and then annually for life. Additional  appointments may be necessary. Gastric Band patient follow-up is determined by my need for  adjustments but is at least once per year after the first year  Diet Quick Review  7-Day Preoperative Liquid Diet  Benefits:   Reducing intake before surgery will shrink  your liver by depleting glycogen (a form of  stored energy). Reduced liver size gives better access to  stomach during surgery, which translates  to a safer surgery. Prevents the last supper syndrome. Experiencing weight loss before the  procedure encourages postoperative  compliance and jump-starts weight loss. Specifics:   Start seven days before surgery:  ____________________. NO SOLID FOODS!! Your surgery will be CANCELED if this  diet is not followed!!! Minimum of 64 ounces of fluid daily,  including protein drinks. No added sugar or carbonated beverages.    Continue to take all your prescribed  medication and your vitamin supplements  during this preoperative diet phase. Clear liquids:   Water. Sugar free, non-carbonated beverages  (crystal light, propel). Sugar free popsicles. Sugar free Jell-O. Fat free, reduced sodium broth . Decaffeinated coffee or decaffeinated tea  with artificial sweeteners. Protein:   60 grams of protein daily  (in liquid supplements). Pre-made protein drinks. Protein powder added to water. 3 gram rule -- limit sugar and fat to less  than 3 grams per 8 ounces. 4 to 6 ounces of low fat/low sugar yogurt  OR cottage cheese three to four times  during the week. [facility] Gastric Bypass and Sleeve Dietary Progression Quick Review     Date of Surgery: _      __________Clear liquid diet. Begin bariatric clear liquid diet on: ______________________________________________   64 ounces of fluid per day. Low calorie, low sugar, non-carbonated beverages:  -- Water, Crystal Light, Propel Water, Sugar Free Jell-O, Sugar Free Popsicles, bouillon. -- Start protein supplement during this stage (60 to 70 grams per day). -- Start all vitamin supplements during this stage (see pages 57-58). -- Getting your fluid in and staying hydrated is your number one priority! The clear liquid diet will last for seven days. ____________________________________________________     t. Begin bariatric soft and moist diet on: _____________________________________________   This stage of the diet will last for five weeks, unless otherwise instructed by your surgeon. Begin -- one week after surgery. End -- six weeks after surgery (or when you follow up with the registered dietitian). Soft, moist, high protein foods -- three meals per day plus protein supplements. -- Portions should emphasize on soft protein. -- Portions will be a MAXIMUM of 1 ounce of solid food and 2 to 3 ounces of cottage cheese and yogurt. -- Protein supplements should be between meals and provide 30 to 40 grams per day during  soft protein diet.   -- Continue to get 64 ounces of fluid in per day. Protein foods that are OK (SLOW TRANSITION) on the soft and moist diet:  -- First week on soft protein should focus on yogurt, cottage cheese, eggs, VEGETARIAN refried  beans, black beans, kidney beans and white beans. (NO BAKED BEANS.)  -- Second through fourth weeks should focus on yogurt, cottage cheese, eggs, canned tuna,  canned chicken, tilapia and fish (needs to be soft enough to be cut up with a fork). -- Fifth week on soft protein diet should focus on yogurt, cottage cheese, eggs, canned tuna,  canned chicken, tilapia, fish, salmon, chicken breast or turkey. Remember to continue to get 64 ounces of fluid daily on ALL stages. To be advanced to bariatric maintenance stage of the bariatric diet, follow up with the dietitian  six weeks after surgery, around:   ___________________________________________________  After having a sleeve gastrectomy I will not be able to take NSAIDs  (non-steroidal anti-inflammatory drugs) for _________ weeks. 15. After having a gastric bypass I will not be able to take NSAIDs  (non-steroidal anti-inflammatory drugs) for _____________ weeks     Please discuss all of your current medications with your surgeon at your preoperative appointment. Your surgeon will inform you regarding which medications to stop before surgery and which  medications you are to take the morning of surgery. Medications to Stop  To minimize the risk of blood loss during surgery,  you must avoid or stop taking medicines that  contain anti-inflammatories, blood thinners, arthritis  medications, and herbal supplements seven to 14 days  before your surgery. A nurse from pre-anesthesia  testing will review your list of medication. Your current  medications will be reviewed at your preoperative  appointment with your surgeon. Note: You may take prescribed narcotics, such as  Vicodin, Ultram and Neurontin.   Diabetic Medications  Adjustments in your diabetic medications will be discussed with your surgeon at your  preoperative appointment. Birth Control  In order to decrease your chance of getting a postoperative blood clot you will be required to stop  any oral contraceptive two weeks before your surgery date. During this time it is your responsibility  to use an effective form of birth control. You will be required to take a pregnancy test the morning  of surgery at the hospital and surgery will be canceled if you are pregnant. We strongly encourage  that you resume your birth control two weeks after surgery or after your first menstrual cycle  following surgery. Do NOT start any new medications within a month of surgery without  discussing it with your surgeon and/or bariatric team first.  Non-Steroidal Anti-Inflammatory Drugs (NSAIDs)  Bypass:  One class of medications to avoid after Cornel-en-Y gastric  bypass is NSAIDs. These can cause ulcers or stomach irritation  and are linked to a kind of ulcer called a marginal ulcer after  gastric bypass. Marginal ulcers can bleed or perforate. Usually  they are not fatal, but they can cause months or years of pain,  and are a common cause of re-operation and reversal of gastric  bypass. You will NEVER be able to take NSAIDs again. Your only choice for over the counter pain medication will be  Tylenol (acetaminophen). Steroid use can also be harmful to your stomach but may be necessary in some situations. Please consult with your bariatric surgeon and prescribing physician for approval.  Sleeve gastrectomy:  Following a sleeve gastrectomy you will not be allowed to take NSAIDs unless it has been  discussed and approved by your surgeon. Most commonly taken NSAIDs to be AVOIDED!! 1. Ibuprofen  2. Advil  3. Motrin  4. Excedrin  5. Aspirin  6. Celebrex  7. Naproxen  8. Aleve  9. Voltaren  10. Mobic     ___Pregnancy  It is in your best interest to avoid pregnancy for  12 to 18 months after surgery.  Pregnancy during  the rapid weight loss phase can be dangerous  and harmful to both mother and fetus. Do you have an effective method of birth  control? Please consult with your OB/GYN or  PCP for consultation. Alcohol  Alcohol is not recommended after bariatric  surgery. Alcohol contains calories but minimal  nutrition and will work against your weight  loss goal. For example, wine contains twice  the calories per ounce that regular soda does. The absorption of alcohol changes with gastric  bypass and gastric sleeve because an enzyme  in the stomach which usually begins to digest  alcohol is absent or greatly reduced making  alcohol more potent. Alcohol may also be absorbed more quickly  into the body after gastric bypass or gastric  sleeve. The absorbed alcohol will be more  potent, and studies have demonstrated  that obesity surgery patients reach a higher  alcohol level and maintain the higher levels for  a longer period than others. In some patients  alcohol use can increase and lead to alcohol  dependence or addiction. For all of these  reasons, it is recommended to avoid alcohol  after bariatric surgery. ___________________________________________  Smoking  It is required that ALL patients stop smoking  (including e-cigarettes and marijuana) and  chewing tobacco three months before  surgery. Prior to surgery your surgeon will  order a test to verify that you have quit. Your  surgery will be canceled if you are smoking. Smoking or chewing tobacco leads to  decreased blood supply to your bodys tissues  and delays healing. Smoking harms every  organ in the body and has been linked to:   Blood clots (the leading cause of death after  bariatric surgery). Marginal ulcers after gastric bypass. Heart disease. Stroke. Chronic obstructive pulmonary  (lung) disease. Increased risk for hip fracture. Cataracts.    Cancer of the mouth, throat, esophagus,  larynx (voice box), stomach, pancreas,  bladder, cervix, and kidney. Packing For the [address] your suitcase for the hospital a day or two before your surgery. Anti-skid socks will be provided. Items to Include in Your Bag   Clothing such as short gowns, short pajamas,  shorts, tops, loose-fitting shorts, bathrobe,  capris, etc.   Tennis shoes or flat runner sole shoes that tie. Toiletries. Waterless hand . Eyeglasses, contact lenses and denture cases. A list of medications you are currently taking,  including frequency and dosages. Magazines, books, needle work, crossword  puzzles, etc.   A method of payment to pay for prescriptions. What Not to Bring to the [address] wallet or purse. Jewelry or other valuables. Open-toe slippers or shoes without backs. Countdown to Surgery  14 to 30 Days   Attend a preoperative class with the  dietitian and bariatric coordinator. Pre-admission testing will contact you. Schedule your preoperative appointment  with your surgeon. 10 to 14 Days   Stop taking medications as instructed by  your physician. Seven Days   Start liquid diet. Stop all NSAIDS/aspirin. Three Days Before Surgery   Begin CHG skin prep. Day Before Surgery   Pack for the hospital.   Surgical time will be provided via phone call. YOU MAY NOT HAVE ANYTHING TO EAT  OR DRINK AFTER MIDNIGHT ON THE DAY  BEFORE YOUR SURGERY. This includes gum,  mints, water, etc. You may brush your teeth  the morning of surgery but do not swallow  the water. Simply rinse your mouth out. Day of Surgery   Take medications and brush your teeth  with a sip (1 teaspoon) of water (only the  medications you have been instructed to  take by your surgeon). Remember to report two hours before your  surgery time.  This will give the nursing staff  sufficient time to start IVs, prep you for  surgery and answer questions  If instructed by your surgeon to use sliding scale insulin   o Use regular insulin (Novolog Pen) according to the following insulin sliding scale:  BLOOD SUGAR: AMOUNT OF INSULIN:  Under 150 no insulin  150-200 2 units  201-250 4 units  251-300 6 units  301-350 8 units  351-400 10 units  400 or greater 12 units and call physician     Things to do following the preoperative class:  ? Thoroughly read this binder before surgery. Things to do before surgery:  ? Start the preoperative liquid diet on: _____________________________________________  ? Stop all NSAIDS (see page 30) and aspirin seven days before my surgery: _________________ .  Sheryl Jarquin my doctor(PCP or surgeon) regarding stopping Coumadin, Plavix or  other blood thinners. ? Purchase bariatric clear liquids (Crystal Lite, sugar free Jell-O, broth, sugar free popsicles,  protein supplement) and bariatric soft and moist foods (low fat yogurt, cottage cheese, eggs,  tuna, fish, chicken) so that Im prepared when I get home from the hospital.  ? Purchase all vitamins that will be required following surgery. o Chewable multivitamin -- two per day (ex: Flintstones Complete). o Calcium Citrate -- 1,500 milligrams (500 milligrams, three times a day). o Vitamin B-12 -- 1,000 micrograms daily. o Vitamin D3 -- 5,000 IU daily. Vitamin B1 100mg daily  ? Create a system to keep track of how many ounces of fluid I am drinking daily  o Postoperative GOAL = minimum of 64 ounces per day. ? Purchase a protein supplement that I like.  o Brand: _ _________________________________________________________________ .  o Ounces: _________________________________________________________________ .  Charley Zamora of protein per serving: _________________________________________________ . -- 28 --  PATIENT GUIDE TO Dorian Guzman 950  6. YOUR SURGERY  Day of Surgery  Before You Leave For the Hospital   Brush your teeth -- upon awakening, you may brush your teeth and rinse with water, but do not  swallow the water.    Take medication -- take only the medications as instructed by your provider with a small sip of  water as soon as you get up. Wear proper clothing that is loose-fitting and easily removed. Avoid back zippers and pantyhose. Please remove ALL jewelry (leave ALL jewelry and valuables at home). Avoid using perfumes, deodorant, shaving creams or any scented lotions. Bring a case with your name on it to hold your eyeglasses, contact lenses, hearing aids  and dentures. Reporting to the [address] [place] [address] will be asked to arrive approximately two  hours before your scheduled surgery. It is important  that you arrive on time to the hospital to avoid any  problems with starting your surgery on time. In some  cases lateness could result in moving your surgery to  a much later time. Your physicians office will provide  your time of arrival to the hospital.  Where Do You Report the Day of Surgery? [place]: [id number] , [place], [address]. Enter through the main entrance. Turn left just past the information desk into the  Registration Office. You will check in for surgery there. You may designate one person to be contacted when your surgery has been completed. -- 36 --  [facility]  Before Surgery  Preoperative     Preoperative Preparation Area  Once you arrive at the hospital you will be escorted to the preoperative preparation area. During the preoperative phase, a nurse will review your medical records and conduct a brief  physical examination to include vital signs (i.e., blood pressure, pulse, temperature, respirations or  breathing). An intravenous tube will be inserted with IV fluids. Your skin will be cleansed with CHG  wipes. If you wear dentures, eyeglasses or contact lenses you will need to remove them at this time.   You will see your surgeon, your anesthesiologist and meet the members of your surgical team.  Medications, such as antibiotics, may be given by anesthetists to decrease your infection risk. Other medications may be given to allay any anxiety you may have. You are allowed to have two family members or friends in the preoperative area before surgery. Going into Surgery  The operating room team will escort you into the operating room where your abdomen will be  prepared for surgery. There will be a time out -- a verification of the correct operative site for  patient safety purposes -- performed. Once in the operating room, monitoring devices, such  as a blood pressure cuff, heart monitor and oxygen monitor, will be attached. You will be given  supplemental oxygen as you are readied for anesthesia. The average length of time for a laparoscopic sleeve gastrectomy is 60 to 90 minutes. The average length of time for a Cornel-en-Y gastric bypass is two to two and a half hours. In the Recovery Area  After your surgery is completed, you will be wheeled into the recovery room. In the recovery room:   Nurses will frequently check your vital signs (i.e., blood pressure, pulse, breathing). Nurses will medicate you for your pain as needed through the IV line. Nurses will encourage you to take deep breaths and to move your ankles and feet. Please inform your family the length of time in the recoveryYou will move to your hospital room from the recovery area when you are ready. Your post-surgical  recovery begins here. room will albert  What to Expect During [address]  From the recovery room, you will be transferred to your hospital room on the bariatric unit  known as 2 Surgical. The private rooms are spacious with large windows and beautiful views. The staff is specially trained in caring for bariatric patients and are extremely friendly and  knowledgeable. We look forward to caring for you during your hospital stay. IV -- IV fluids will be given to help nourish your body after surgery. You will also be given IV pain  medication.  IV fluids will continue until you are discharged from the hospital.  Heart rate monitor (telemetry) -- A heart rate monitor will be worn only in the recovery room  unless otherwise indicated. Garcia catheter -- A Garcia catheter will be placed in your bladder while you are in the OR and  removed when you arrive to the recovery room. Nasal cannula -- Oxygen will be worn in the nose the night of surgery. Anticoagulation -- A blood thinner may be administered to you to prevent blood clots. Lovenox,  an injectable medication will be used. Drainage tube -- A drainage tube may be inserted into your abdomen during surgery. This tube  collects bloody drainage after surgery. This may be removed prior to discharge from the hospital or  you may be discharged with the drain and it will be removed by your surgeon at your postoperative  visit. If you are discharged with a drain you will be taught how to empty it and care for it. After Surgery   If you do not see your providers clean their hands, please ask them to do so. Family and friends who visit you should not touch the surgical wound or dressings. Family and friends should clean their hands with soap and water or an alcohol-based hand  rub before and after visiting you. If you do not see them clean their hands, ask them to clean  their hands. Make sure you understand how to care for your incision before you leave the hospital.   Always clean your hands before and after caring for your incision. Make sure you know who to contact if you have questions or problems after you get home. If you have any symptoms of an infection, such as redness and pain at the surgery site, drainage,  or fever, call your doctor immediately. Ask your nursing staff to help you out of bed to walk within five hours of arriving at your  hospital room. Getting out of bed to walk helps to decrease complications, such as blood clots  and pneumonia.    of Stay  Yl be required to stay in the hospital for at least ONE night, possibly TWO. Lngth of Stay  Ryley Stoll be required to stay in the hospital for at least ONE night, possibly TWO.  edications and Pain Control Options  There are many types of pain control methods that are available to control discomfort. Effective  pain control will allow you to be up and walking shortly after surgery. Your doctor will choose  the method that is right for you. Regardless of the method of pain medication being used, it is  important for you to communicate with your nurse if the pain medication is not enough. Call your  nurse for pain medication when the pain is moderate instead of waiting for when pain is severe. What type of pain should I expect? Abdominal pain   Rib pain   Shoulder pain   Brick feeling in the center of your chest  Nausea:  Nausea following bariatric surgery is very  common. Causes include: Anesthesia   Drinking too fast   Pain medication   Surgery itself           Length of Stay  You willExpectations on your Day of Surgery   You will be allowed 1 to 2 ounces of ice chips per hour when you are admitted to the floor. They are solely for your comfort. They are not required. You will be expected to walk the night of your surgery. Please ask your nurse or nursing assistant  for help the first time you walk. Please do not walk if you still feel groggy or unsteady on your feet. Your pain will be controlled with oral and IV pain medication on the day of surgery. In order to prevent pneumonia after surgery you please use your incentive spirometer (ICS)  at least 10 times per hour (see below). be [email or web address]  Bariatric Clear Liquid Diet  You will be started on the bariatric clear liquid diet the morning after your surgery. Your GOAL  will be to drink 4 ounces per hour (SLOW and STEADY) of the following liquids: water, crystal lite,  Jell-O, broth and Unjury (protein supplement). Feel free to bring your favorite protein supplement  from home.    Two important requirements when drinking is you must slowly SIP the fluid and you must be  SITTING up. Walking  while in the hospital you are expected to walk EVERY hour in the hallway. During your hospital  stay you will also be expected to sit in the recliner throughout the day rather than lie in bed. Pain Medication  The morning after surgery you will continue with oral pain medication ONLY for your pain control. Incentive Spirometer  Continue using your incentive spirometer(ICS) 10 times per hour.

## 2023-02-10 NOTE — H&P (VIEW-ONLY)
Bariatric Surgery Progress Note    CC: Preop appointment for bariatric surgery  Subjective:     Patient presents for a preop appointment for bariatric surgery having completed the insurance-mandated and clinically-relevant clearances/counseling. REVIEW:    Finished nutrition sessions, cleared by RD    PCP clearance in chart    Cardiac risk stratification/optimization in chart, along with unconcerning stress test    Psych clearance in chart, cleared by Dr. Nohelia Yung reviewed:  Vitamin D deficiency  Elevated .9, down to 77 after initiation of vitamin D replacement and calcium supplementation  H pylori negative  Thiamine normal  Iron studies normal  Hgb 11.9, minimal anemia  TSH normal  Folate, B12 normal    UGI outside report (images not available): \"Focal neostomach contrast filled outpouching, tertiary nonpropulsive contractions of the esophagus, TAMMIE    EGD  Findings:   3x4cm gastric pouch  3cm gastrojejunal anastomosis (dilated gastrojejunostomy, technical failure of gastric bypass)  5cm blind end of navid limb (candy cane, this must be the \"focal neostomach contrast filled outpouching\" seen on the UGI)  Normal navid limb mucosa  Unable to retroflex in pouch due to small pouch, possible laxity at hiatus    Has started ProCare Chewable with iron, vitamin D daily and a weekly vitamin D 50,000IU dose, as well as calcium citrate TID.     Previous relevant surgeries: open gastric bypass, lap deisi, vaginal hysterectomy, abdominoplasty  Patient Active Problem List    Diagnosis Date Noted    Morbid obesity due to excess calories ([facility] [address] 08/02/2022    History of gastric bypass 08/02/2022    Postsurgical malabsorption 08/02/2022    Gastroesophageal reflux disease without esophagitis 03/22/2021    Anemia, deficiency 11/09/2020    Vitamin D deficiency 11/09/2020    Obstructive sleep apnea syndrome 07/21/2020    Restless leg syndrome 07/21/2020    Polyneuropathy 05/16/2018    Lymphedema 10/19/2017    Bilateral leg edema 2017    Peripheral arterial occlusive disease ([facility] [address] 2016      Past Medical History:   Diagnosis Date    Anemia     Arthritis     Asthma     Frequency of urination     Lumbago with sciatica     Morbid obesity (HCC)     Nocturia     OAB (overactive bladder)     Recurrent UTI     Tendonitis     Urinary urgency     Vitamin B12 deficiency      Past Surgical History:   Procedure Laterality Date    HX ABDOMINOPLASTY      HX  SECTION      x3. HX CHOLECYSTECTOMY      lap    HX COLONOSCOPY      HX GASTRIC BYPASS  1999    open    HX HYSTERECTOMY      HX HYSTERECTOMY  2013    vaginal      Social History     Tobacco Use    Smoking status: Never    Smokeless tobacco: Never   Substance Use Topics    Alcohol use: No      History reviewed. No pertinent family history. Prior to Admission medications    Medication Sig Start Date End Date Taking? Authorizing Provider   ergocalciferol (ERGOCALCIFEROL) 1,250 mcg (50,000 unit) capsule TAKE 1 CAPSULE BY MOUTH WEEKLY FOR 90 DAYS 22  Yes Provider, Historical   fluticasone propionate (FLONASE) 50 mcg/actuation nasal spray 1 SPRAY IN EACH NOSTRIL ONCE A DAY 30 DAYS 23  Yes Provider, Historical   meclizine (ANTIVERT) 12.5 mg tablet TAKE 1 TAB ORALLY EVERY 8 HOURS AS NEEDED VERTIGO 10 DAYS 23  Yes Provider, Historical   calcium-vitamin D (OS-LESA +D3) 500 mg-200 unit per tablet Take 1 Tablet by mouth daily. Yes Provider, Historical   folic acid-vit Q7-PZB P63 (FOLBEE, AV-SHAWNA, VIRT-SHAWNA) 2.5-25-1 mg tablet Take 1 Tablet by mouth in the morning. Yes Provider, Historical   famotidine (PEPCID) 40 mg tablet Take 40 mg by mouth in the morning. 10/5/21  Yes Provider, Historical   rOPINIRole (REQUIP) 0.5 mg tablet Take 0.5 mg by mouth nightly. Yes Provider, Historical   gabapentin (NEURONTIN) 300 mg capsule Take 300 mg by mouth three (3) times daily.    Yes Provider, Historical   topiramate (TOPAMAX) 25 mg tablet Take  by mouth two (2) times a day.   Yes Provider, Historical   cyclobenzaprine (FLEXERIL) 10 mg tablet Take 10 mg by mouth three (3) times daily as needed for Muscle Spasm(s). Yes Provider, Historical   albuterol (PROVENTIL HFA, VENTOLIN HFA, PROAIR HFA) 90 mcg/actuation inhaler    Yes Provider, Historical   cholecalciferol (VITAMIN D3) 25 mcg (1,000 unit) cap Take  by mouth daily. Yes Provider, Historical     Allergies   Allergen Reactions    Codeine Other (comments)     abd cramping    Duloxetine Hcl Other (comments)     Other reaction(s): GI upset    Sertraline Other (comments)     GI upset    Tramadol Other (comments)     Gi distress          Review of Systems:    Constitutional: No fever or chills  Neurologic: No headache  Eyes: No scleral icterus or irritated eyes  Nose: No nasal pain or drainage  Mouth: No oral lesions or sore throat  Cardiac: No palpations or chest pain  Pulmonary: No cough or shortness of breath  Gastrointestinal: No nausea, emesis, diarrhea, or constipation  Genitourinary: No dysuria  Musculoskeletal: No muscle or joint tenderness  Skin: No rashes or lesions  Psychiatric: No anxiety or depressed mood      Objective:     Physical Exam:  Visit Vitals  /64   Pulse 64   Temp 97.2 °F (36.2 °C)   Resp 18   Ht 5' 5\" (1.651 m)   Wt 109.3 kg (241 lb)   SpO2 100%   BMI 40.10 kg/m²     General: No acute distress, conversant  Eyes: PERRLA, no scleral icterus  HENT: Normocephalic without oral lesions  Neck: Trachea midline without LAD  Cardiac: Normal pulse rate and rhythm  Pulmonary: Symmetric chest rise with normal effort  GI: Soft, NT, ND, no hernia, no splenomegaly  Skin: Warm without rash  Extremities: No edema or joint stiffness  Psych: Appropriate mood and affect    Assessment:     Morbid obesity with comorbidities  Plan:   The patient and I had further discussion after their successful supervised weight loss, medical, dietary, and psychiatric clearance. Preoperative upper GI/EGD reviewed.  The patient elects to proceed with revision of gastric bypass (laparoscopic, possible endoscopic). We have reviewed the bariatric risk calculator and they understand the risks of surgery for their individual risk factors. At this point they are cleared for surgery from my point of view and will be submitted for insurance approval.    Patient will be consented for: laparoscopic, possible endoscopic revision of gastric bypass (last case of day). We have discussed the possible complications of bariatric surgery which include but are not limited to failed weight loss, weight regain, malnutrition, leak, bleed, stricture, gastric ulcer, gastric fistula, gastric bleed, esophageal injury, gallstones, new or worsening gastric reflux, nausea, emesis, internal hernia, abdominal wall hernia, gastric perforation, need for revision / conversion / or reversal, pregnancy complications and loss, intestinal ischemia, post operative skin complications, possible thinning of their hair, bowel obstruction, dumping syndrome, wound infection, blood clots (DVT, mesenteric thrombus, pulmonary embolism), increased addictive tendency, risk of anesthesia, MI, stroke, and death. They expressed complete understanding and had no further questions. The patient understands this is a life altering decision and will require compliance to the program for the remainder of their life in order to be monitored to avoid complication and ensure successful, sustained weight control. They will be placed on a lifelong low carbohydrate and low sugar diet, exercise, and vitamin regimen and will require frequent blood draws and office visits to ensure adequate nutrition and program compliance. Visits and follow up will be in compliance with the guidelines set forth by [facility]. I have specifically mentioned the need to avoid all personal and second-hand tobacco exposure, systemic steroids, and NSAIDS after any bariatric surgery to help avoid the above listed complications. The patient has expressed understanding of the above and would like to proceed with surgery.       Signed By: Capri Flores MD [facility]  Bariatric and General Surgeon  [facility]    January 31, 2023

## 2023-02-15 RX ORDER — ALBUTEROL SULFATE 90 UG/1
2 AEROSOL, METERED RESPIRATORY (INHALATION) PRN
COMMUNITY
Start: 2011-09-14

## 2023-02-15 RX ORDER — FLUTICASONE PROPIONATE 110 UG/1
2 AEROSOL, METERED RESPIRATORY (INHALATION)
COMMUNITY

## 2023-02-20 ENCOUNTER — TELEPHONE (OUTPATIENT)
Age: 53
End: 2023-02-20

## 2023-02-20 NOTE — TELEPHONE ENCOUNTER
Patient called stating she is having surgery on 2/22/2023 for revision and is on liquid diet. States feeling weak. Statis she is getting in 64oz water per day and drinking broth, protein shakes, propel water, popsicle's and jello. Asks what can she do so she doesn't feel so weak before surgery. I spoke with NP M.B. and advised patient she could eat 1 cup of yogurt or 1 cup of cottage cheese per day and to focus on more protein in her shake or broth with a little more calories. Patient verbalized understanding and will call us back if sx persists.

## 2023-02-21 ENCOUNTER — PREP FOR PROCEDURE (OUTPATIENT)
Age: 53
End: 2023-02-21

## 2023-02-21 ENCOUNTER — ANESTHESIA EVENT (OUTPATIENT)
Facility: HOSPITAL | Age: 53
DRG: 330 | End: 2023-02-21
Payer: MEDICARE

## 2023-02-21 ENCOUNTER — TELEPHONE (OUTPATIENT)
Age: 53
End: 2023-02-21

## 2023-02-21 RX ORDER — SODIUM CHLORIDE 0.9 % (FLUSH) 0.9 %
5-40 SYRINGE (ML) INJECTION PRN
Status: CANCELLED | OUTPATIENT
Start: 2023-02-21

## 2023-02-21 RX ORDER — SCOLOPAMINE TRANSDERMAL SYSTEM 1 MG/1
1 PATCH, EXTENDED RELEASE TRANSDERMAL
Status: CANCELLED | OUTPATIENT
Start: 2023-02-21 | End: 2023-02-24

## 2023-02-21 RX ORDER — SODIUM CHLORIDE, SODIUM LACTATE, POTASSIUM CHLORIDE, CALCIUM CHLORIDE 600; 310; 30; 20 MG/100ML; MG/100ML; MG/100ML; MG/100ML
INJECTION, SOLUTION INTRAVENOUS CONTINUOUS
Status: CANCELLED | OUTPATIENT
Start: 2023-02-21

## 2023-02-21 RX ORDER — SODIUM CHLORIDE 0.9 % (FLUSH) 0.9 %
5-40 SYRINGE (ML) INJECTION EVERY 12 HOURS SCHEDULED
Status: CANCELLED | OUTPATIENT
Start: 2023-02-21

## 2023-02-21 RX ORDER — SODIUM CHLORIDE 9 MG/ML
INJECTION, SOLUTION INTRAVENOUS PRN
Status: CANCELLED | OUTPATIENT
Start: 2023-02-21

## 2023-02-21 RX ORDER — ENOXAPARIN SODIUM 100 MG/ML
40 INJECTION SUBCUTANEOUS ONCE
Status: CANCELLED | OUTPATIENT
Start: 2023-02-21 | End: 2023-02-21

## 2023-02-21 RX ORDER — FAMOTIDINE 10 MG/ML
20 INJECTION, SOLUTION INTRAVENOUS ONCE
Status: CANCELLED | OUTPATIENT
Start: 2023-02-21 | End: 2023-02-21

## 2023-02-21 RX ORDER — APREPITANT 40 MG/1
40 CAPSULE ORAL ONCE
Status: CANCELLED | OUTPATIENT
Start: 2023-02-21

## 2023-02-22 ENCOUNTER — HOSPITAL ENCOUNTER (INPATIENT)
Facility: HOSPITAL | Age: 53
LOS: 1 days | Discharge: HOME | DRG: 330 | End: 2023-02-22
Attending: SURGERY | Admitting: SURGERY
Payer: MEDICARE

## 2023-02-22 ENCOUNTER — ANESTHESIA (OUTPATIENT)
Facility: HOSPITAL | Age: 53
DRG: 330 | End: 2023-02-22
Payer: MEDICARE

## 2023-02-22 VITALS
HEIGHT: 65 IN | SYSTOLIC BLOOD PRESSURE: 104 MMHG | RESPIRATION RATE: 15 BRPM | WEIGHT: 241 LBS | BODY MASS INDEX: 40.15 KG/M2 | TEMPERATURE: 98.3 F | HEART RATE: 63 BPM | DIASTOLIC BLOOD PRESSURE: 51 MMHG | OXYGEN SATURATION: 100 %

## 2023-02-22 DIAGNOSIS — Z98.84 HISTORY OF GASTRIC BYPASS: Primary | ICD-10-CM

## 2023-02-22 DIAGNOSIS — G89.18 POST-OP PAIN: ICD-10-CM

## 2023-02-22 DIAGNOSIS — E66.01 MORBID OBESITY (HCC): ICD-10-CM

## 2023-02-22 PROCEDURE — 2500000003 HC RX 250 WO HCPCS: Performed by: SURGERY

## 2023-02-22 PROCEDURE — 6360000002 HC RX W HCPCS: Performed by: NURSE ANESTHETIST, CERTIFIED REGISTERED

## 2023-02-22 PROCEDURE — 3700000001 HC ADD 15 MINUTES (ANESTHESIA): Performed by: SURGERY

## 2023-02-22 PROCEDURE — 2709999900 HC NON-CHARGEABLE SUPPLY: Performed by: SURGERY

## 2023-02-22 PROCEDURE — 2580000003 HC RX 258: Performed by: SURGERY

## 2023-02-22 PROCEDURE — 7100000010 HC PHASE II RECOVERY - FIRST 15 MIN: Performed by: SURGERY

## 2023-02-22 PROCEDURE — C1713 ANCHOR/SCREW BN/BN,TIS/BN: HCPCS | Performed by: SURGERY

## 2023-02-22 PROCEDURE — 7100000011 HC PHASE II RECOVERY - ADDTL 15 MIN: Performed by: SURGERY

## 2023-02-22 PROCEDURE — 6360000002 HC RX W HCPCS: Performed by: SURGERY

## 2023-02-22 PROCEDURE — 2500000003 HC RX 250 WO HCPCS: Performed by: NURSE ANESTHETIST, CERTIFIED REGISTERED

## 2023-02-22 PROCEDURE — 3600000004 HC SURGERY LEVEL 4 BASE: Performed by: SURGERY

## 2023-02-22 PROCEDURE — 6370000000 HC RX 637 (ALT 250 FOR IP): Performed by: SURGERY

## 2023-02-22 PROCEDURE — 0D1A47A BYPASS JEJUNUM TO JEJUNUM WITH AUTOLOGOUS TISSUE SUBSTITUTE, PERCUTANEOUS ENDOSCOPIC APPROACH: ICD-10-PCS | Performed by: SURGERY

## 2023-02-22 PROCEDURE — 2580000003 HC RX 258: Performed by: NURSE ANESTHETIST, CERTIFIED REGISTERED

## 2023-02-22 PROCEDURE — A4216 STERILE WATER/SALINE, 10 ML: HCPCS | Performed by: SURGERY

## 2023-02-22 PROCEDURE — 44180 LAP ENTEROLYSIS: CPT | Performed by: SURGERY

## 2023-02-22 PROCEDURE — 2720000010 HC SURG SUPPLY STERILE: Performed by: SURGERY

## 2023-02-22 PROCEDURE — 00790 ANES IPER UPR ABD NOS: CPT | Performed by: ANESTHESIOLOGY

## 2023-02-22 PROCEDURE — 7100000001 HC PACU RECOVERY - ADDTL 15 MIN: Performed by: SURGERY

## 2023-02-22 PROCEDURE — 3600000014 HC SURGERY LEVEL 4 ADDTL 15MIN: Performed by: SURGERY

## 2023-02-22 PROCEDURE — 7100000000 HC PACU RECOVERY - FIRST 15 MIN: Performed by: SURGERY

## 2023-02-22 PROCEDURE — 1100000000 HC RM PRIVATE

## 2023-02-22 PROCEDURE — C9290 INJ, BUPIVACAINE LIPOSOME: HCPCS | Performed by: SURGERY

## 2023-02-22 PROCEDURE — 3700000000 HC ANESTHESIA ATTENDED CARE: Performed by: SURGERY

## 2023-02-22 RX ORDER — HYDROMORPHONE HYDROCHLORIDE 1 MG/ML
0.5 INJECTION, SOLUTION INTRAMUSCULAR; INTRAVENOUS; SUBCUTANEOUS EVERY 5 MIN PRN
Status: DISCONTINUED | OUTPATIENT
Start: 2023-02-22 | End: 2023-02-22 | Stop reason: HOSPADM

## 2023-02-22 RX ORDER — SCOLOPAMINE TRANSDERMAL SYSTEM 1 MG/1
1 PATCH, EXTENDED RELEASE TRANSDERMAL
Status: DISCONTINUED | OUTPATIENT
Start: 2023-02-22 | End: 2023-02-22 | Stop reason: HOSPADM

## 2023-02-22 RX ORDER — DEXAMETHASONE SODIUM PHOSPHATE 4 MG/ML
INJECTION, SOLUTION INTRA-ARTICULAR; INTRALESIONAL; INTRAMUSCULAR; INTRAVENOUS; SOFT TISSUE PRN
Status: DISCONTINUED | OUTPATIENT
Start: 2023-02-22 | End: 2023-02-22 | Stop reason: SDUPTHER

## 2023-02-22 RX ORDER — ROCURONIUM BROMIDE 10 MG/ML
INJECTION, SOLUTION INTRAVENOUS PRN
Status: DISCONTINUED | OUTPATIENT
Start: 2023-02-22 | End: 2023-02-22 | Stop reason: SDUPTHER

## 2023-02-22 RX ORDER — FENTANYL CITRATE 50 UG/ML
25 INJECTION, SOLUTION INTRAMUSCULAR; INTRAVENOUS EVERY 5 MIN PRN
Status: DISCONTINUED | OUTPATIENT
Start: 2023-02-22 | End: 2023-02-22 | Stop reason: HOSPADM

## 2023-02-22 RX ORDER — MIDAZOLAM HYDROCHLORIDE 1 MG/ML
INJECTION INTRAMUSCULAR; INTRAVENOUS PRN
Status: DISCONTINUED | OUTPATIENT
Start: 2023-02-22 | End: 2023-02-22 | Stop reason: SDUPTHER

## 2023-02-22 RX ORDER — SODIUM CHLORIDE 0.9 % (FLUSH) 0.9 %
5-40 SYRINGE (ML) INJECTION EVERY 12 HOURS SCHEDULED
Status: DISCONTINUED | OUTPATIENT
Start: 2023-02-22 | End: 2023-02-22 | Stop reason: HOSPADM

## 2023-02-22 RX ORDER — SUCCINYLCHOLINE/SOD CL,ISO/PF 100 MG/5ML
SYRINGE (ML) INTRAVENOUS PRN
Status: DISCONTINUED | OUTPATIENT
Start: 2023-02-22 | End: 2023-02-22 | Stop reason: SDUPTHER

## 2023-02-22 RX ORDER — SODIUM CHLORIDE 9 MG/ML
INJECTION, SOLUTION INTRAVENOUS PRN
Status: DISCONTINUED | OUTPATIENT
Start: 2023-02-22 | End: 2023-02-22 | Stop reason: HOSPADM

## 2023-02-22 RX ORDER — APREPITANT 40 MG/1
40 CAPSULE ORAL ONCE
Status: COMPLETED | OUTPATIENT
Start: 2023-02-22 | End: 2023-02-22

## 2023-02-22 RX ORDER — PROCHLORPERAZINE EDISYLATE 5 MG/ML
5 INJECTION INTRAMUSCULAR; INTRAVENOUS
Status: DISCONTINUED | OUTPATIENT
Start: 2023-02-22 | End: 2023-02-22 | Stop reason: HOSPADM

## 2023-02-22 RX ORDER — DIPHENHYDRAMINE HYDROCHLORIDE 50 MG/ML
12.5 INJECTION INTRAMUSCULAR; INTRAVENOUS
Status: DISCONTINUED | OUTPATIENT
Start: 2023-02-22 | End: 2023-02-22 | Stop reason: HOSPADM

## 2023-02-22 RX ORDER — LIDOCAINE HYDROCHLORIDE 10 MG/ML
1 INJECTION, SOLUTION EPIDURAL; INFILTRATION; INTRACAUDAL; PERINEURAL
Status: DISCONTINUED | OUTPATIENT
Start: 2023-02-22 | End: 2023-02-22 | Stop reason: HOSPADM

## 2023-02-22 RX ORDER — PHENYLEPHRINE HCL IN 0.9% NACL 1 MG/10 ML
SYRINGE (ML) INTRAVENOUS PRN
Status: DISCONTINUED | OUTPATIENT
Start: 2023-02-22 | End: 2023-02-22 | Stop reason: SDUPTHER

## 2023-02-22 RX ORDER — FENTANYL CITRATE 50 UG/ML
INJECTION, SOLUTION INTRAMUSCULAR; INTRAVENOUS PRN
Status: DISCONTINUED | OUTPATIENT
Start: 2023-02-22 | End: 2023-02-22 | Stop reason: SDUPTHER

## 2023-02-22 RX ORDER — OXYCODONE HYDROCHLORIDE 5 MG/1
5 TABLET ORAL EVERY 6 HOURS PRN
Qty: 12 TABLET | Refills: 0 | Status: SHIPPED | OUTPATIENT
Start: 2023-02-22 | End: 2023-02-25

## 2023-02-22 RX ORDER — ONDANSETRON 2 MG/ML
INJECTION INTRAMUSCULAR; INTRAVENOUS PRN
Status: DISCONTINUED | OUTPATIENT
Start: 2023-02-22 | End: 2023-02-22 | Stop reason: SDUPTHER

## 2023-02-22 RX ORDER — LIDOCAINE HYDROCHLORIDE 20 MG/ML
INJECTION, SOLUTION EPIDURAL; INFILTRATION; INTRACAUDAL; PERINEURAL PRN
Status: DISCONTINUED | OUTPATIENT
Start: 2023-02-22 | End: 2023-02-22 | Stop reason: SDUPTHER

## 2023-02-22 RX ORDER — BUPIVACAINE HYDROCHLORIDE 5 MG/ML
INJECTION, SOLUTION EPIDURAL; INTRACAUDAL PRN
Status: DISCONTINUED | OUTPATIENT
Start: 2023-02-22 | End: 2023-02-22 | Stop reason: ALTCHOICE

## 2023-02-22 RX ORDER — SODIUM CHLORIDE 0.9 % (FLUSH) 0.9 %
5-40 SYRINGE (ML) INJECTION PRN
Status: DISCONTINUED | OUTPATIENT
Start: 2023-02-22 | End: 2023-02-22 | Stop reason: HOSPADM

## 2023-02-22 RX ORDER — NEOSTIGMINE METHYLSULFATE 1 MG/ML
INJECTION, SOLUTION INTRAVENOUS PRN
Status: DISCONTINUED | OUTPATIENT
Start: 2023-02-22 | End: 2023-02-22 | Stop reason: SDUPTHER

## 2023-02-22 RX ORDER — KETAMINE HCL 50MG/ML(1)
SYRINGE (ML) INTRAVENOUS PRN
Status: DISCONTINUED | OUTPATIENT
Start: 2023-02-22 | End: 2023-02-22 | Stop reason: SDUPTHER

## 2023-02-22 RX ORDER — PROPOFOL 10 MG/ML
INJECTION, EMULSION INTRAVENOUS PRN
Status: DISCONTINUED | OUTPATIENT
Start: 2023-02-22 | End: 2023-02-22 | Stop reason: SDUPTHER

## 2023-02-22 RX ORDER — MEPERIDINE HYDROCHLORIDE 25 MG/ML
12.5 INJECTION INTRAMUSCULAR; INTRAVENOUS; SUBCUTANEOUS EVERY 5 MIN PRN
Status: DISCONTINUED | OUTPATIENT
Start: 2023-02-22 | End: 2023-02-22 | Stop reason: HOSPADM

## 2023-02-22 RX ORDER — GLYCOPYRROLATE 0.2 MG/ML
INJECTION INTRAMUSCULAR; INTRAVENOUS PRN
Status: DISCONTINUED | OUTPATIENT
Start: 2023-02-22 | End: 2023-02-22 | Stop reason: SDUPTHER

## 2023-02-22 RX ORDER — SODIUM CHLORIDE, SODIUM LACTATE, POTASSIUM CHLORIDE, CALCIUM CHLORIDE 600; 310; 30; 20 MG/100ML; MG/100ML; MG/100ML; MG/100ML
INJECTION, SOLUTION INTRAVENOUS CONTINUOUS
Status: DISCONTINUED | OUTPATIENT
Start: 2023-02-22 | End: 2023-02-22 | Stop reason: HOSPADM

## 2023-02-22 RX ORDER — ACETAMINOPHEN 325 MG/1
650 TABLET ORAL
Status: DISCONTINUED | OUTPATIENT
Start: 2023-02-22 | End: 2023-02-22 | Stop reason: HOSPADM

## 2023-02-22 RX ORDER — ENOXAPARIN SODIUM 100 MG/ML
40 INJECTION SUBCUTANEOUS ONCE
Status: COMPLETED | OUTPATIENT
Start: 2023-02-22 | End: 2023-02-22

## 2023-02-22 RX ORDER — ONDANSETRON 2 MG/ML
4 INJECTION INTRAMUSCULAR; INTRAVENOUS
Status: DISCONTINUED | OUTPATIENT
Start: 2023-02-22 | End: 2023-02-22 | Stop reason: HOSPADM

## 2023-02-22 RX ADMIN — SODIUM CHLORIDE, POTASSIUM CHLORIDE, SODIUM LACTATE AND CALCIUM CHLORIDE: 600; 310; 30; 20 INJECTION, SOLUTION INTRAVENOUS at 11:35

## 2023-02-22 RX ADMIN — PROPOFOL 25 MG: 10 INJECTION, EMULSION INTRAVENOUS at 14:21

## 2023-02-22 RX ADMIN — DEXAMETHASONE SODIUM PHOSPHATE 4 MG: 4 INJECTION, SOLUTION INTRAMUSCULAR; INTRAVENOUS at 12:42

## 2023-02-22 RX ADMIN — Medication 100 MCG: at 13:29

## 2023-02-22 RX ADMIN — HYDROMORPHONE HYDROCHLORIDE 0.5 MG: 1 INJECTION, SOLUTION INTRAMUSCULAR; INTRAVENOUS; SUBCUTANEOUS at 14:57

## 2023-02-22 RX ADMIN — ONDANSETRON 4 MG: 2 INJECTION INTRAMUSCULAR; INTRAVENOUS at 14:09

## 2023-02-22 RX ADMIN — ROCURONIUM BROMIDE 20 MG: 10 INJECTION, SOLUTION INTRAVENOUS at 12:37

## 2023-02-22 RX ADMIN — GLYCOPYRROLATE 0.1 MG: 0.2 INJECTION INTRAMUSCULAR; INTRAVENOUS at 12:27

## 2023-02-22 RX ADMIN — GLYCOPYRROLATE 0.4 MG: 0.2 INJECTION INTRAMUSCULAR; INTRAVENOUS at 14:12

## 2023-02-22 RX ADMIN — Medication 100 MG: at 12:31

## 2023-02-22 RX ADMIN — MIDAZOLAM HYDROCHLORIDE 2 MG: 2 INJECTION, SOLUTION INTRAMUSCULAR; INTRAVENOUS at 12:27

## 2023-02-22 RX ADMIN — FAMOTIDINE 20 MG: 10 INJECTION, SOLUTION INTRAVENOUS at 11:39

## 2023-02-22 RX ADMIN — FENTANYL CITRATE 50 MCG: 50 INJECTION, SOLUTION INTRAMUSCULAR; INTRAVENOUS at 12:31

## 2023-02-22 RX ADMIN — ROCURONIUM BROMIDE 10 MG: 10 INJECTION, SOLUTION INTRAVENOUS at 12:31

## 2023-02-22 RX ADMIN — LIDOCAINE HYDROCHLORIDE 100 MG: 20 INJECTION, SOLUTION EPIDURAL; INFILTRATION; INTRACAUDAL; PERINEURAL at 12:31

## 2023-02-22 RX ADMIN — Medication 25 MG: at 13:50

## 2023-02-22 RX ADMIN — FENTANYL CITRATE 25 MCG: 50 INJECTION, SOLUTION INTRAMUSCULAR; INTRAVENOUS at 14:13

## 2023-02-22 RX ADMIN — ROCURONIUM BROMIDE 5 MG: 10 INJECTION, SOLUTION INTRAVENOUS at 13:50

## 2023-02-22 RX ADMIN — NEOSTIGMINE METHYLSULFATE 3 MG: 1 INJECTION, SOLUTION INTRAVENOUS at 14:12

## 2023-02-22 RX ADMIN — FENTANYL CITRATE 25 MCG: 50 INJECTION, SOLUTION INTRAMUSCULAR; INTRAVENOUS at 13:07

## 2023-02-22 RX ADMIN — WATER 2000 MG: 1 INJECTION, SOLUTION INTRAMUSCULAR; INTRAVENOUS; SUBCUTANEOUS at 12:42

## 2023-02-22 RX ADMIN — ENOXAPARIN SODIUM 40 MG: 100 INJECTION SUBCUTANEOUS at 11:37

## 2023-02-22 RX ADMIN — PROPOFOL 150 MG: 10 INJECTION, EMULSION INTRAVENOUS at 12:31

## 2023-02-22 RX ADMIN — APREPITANT 40 MG: 40 CAPSULE ORAL at 11:37

## 2023-02-22 RX ADMIN — Medication 25 MG: at 12:47

## 2023-02-22 RX ADMIN — PROPOFOL 25 MG: 10 INJECTION, EMULSION INTRAVENOUS at 14:17

## 2023-02-22 ASSESSMENT — PAIN SCALES - GENERAL
PAINLEVEL_OUTOF10: 6
PAINLEVEL_OUTOF10: 10
PAINLEVEL_OUTOF10: 7

## 2023-02-22 NOTE — ANESTHESIA PRE PROCEDURE
Department of Anesthesiology  Preprocedure Note       Name:  Ladan Huerta   Age:  46 y.o.  :  1970                                          MRN:  [id number]         Date:  2023      Surgeon: Fredrick Odell):  Miroslava Chin MD    Procedure: Procedure(s):  LAPAROSCOPIC REVISION, POSSIBLE ENDOSCOPY OF GASTRIC BYPASS  POSSIBLE ESOPHAGOGASTRODUODENOSCOPY    Medications prior to admission:   Prior to Admission medications    Medication Sig Start Date End Date Taking? Authorizing Provider   albuterol sulfate HFA (PROVENTIL;VENTOLIN;PROAIR) 108 (90 Base) MCG/ACT inhaler Inhale 2 puffs into the lungs as needed 11  Yes Historical Provider, MD   fluticasone (FLOVENT HFA) 110 MCG/ACT inhaler Inhale 2 puffs into the lungs    Historical Provider, MD   albuterol sulfate HFA (PROVENTIL;VENTOLIN;PROAIR) 108 (90 Base) MCG/ACT inhaler ceived the following from [facility]: Outside name: albuterol (PROVENTIL HFA, VENTOLIN HFA, PROAIR HFA) 90 mcg/actuation inhaler    Ar Automatic Reconciliation   Calcium Carbonate-Vitamin D (OYSTER SHELL CALCIUM/D) 500-5 MG-MCG TABS Take 1 tablet by mouth daily    Ar Automatic Reconciliation   vitamin D 25 MCG (1000 UT) CAPS Take by mouth daily    Ar Automatic Reconciliation   cyclobenzaprine (FLEXERIL) 10 MG tablet Take 10 mg by mouth 3 times daily as needed    Ar Automatic Reconciliation   famotidine (PEPCID) 40 MG tablet Take 40 mg by mouth daily 10/5/21   Ar Automatic Reconciliation   folic acid-pyridoxine-cyanocobalamine (FOLTX) 2.5-25-1 MG TABS tablet Take 1 tablet by mouth daily    Ar Automatic Reconciliation   gabapentin (NEURONTIN) 300 MG capsule Take 300 mg by mouth 3 times daily.     Ar Automatic Reconciliation   rOPINIRole (REQUIP) 0.5 MG tablet Take 0.5 mg by mouth    Ar Automatic Reconciliation   topiramate (TOPAMAX) 25 MG tablet Take by mouth 2 times daily    Ar Automatic Reconciliation       Current medications:    Current Facility-Administered Medications Medication Dose Route Frequency Provider Last Rate Last Admin    lidocaine PF 1 % injection 1 mL  1 mL IntraDERmal Once PRN Karina Bijou, APRN - CRNA        famotidine (PEPCID) 20 mg in sodium chloride (PF) 0.9 % 10 mL injection  20 mg IntraVENous Once Karina Bijou, APRN - CRNA        lactated ringers IV soln infusion   IntraVENous Continuous Karina Bijou, APRN - CRNA 50 mL/hr at 02/22/23 1135 New Bag at 02/22/23 1135    lactated ringers IV soln infusion   IntraVENous Continuous Nani Rodriguez MD        sodium chloride flush 0.9 % injection 5-40 mL  5-40 mL IntraVENous 2 times per day Nani Rodriguez MD        sodium chloride flush 0.9 % injection 5-40 mL  5-40 mL IntraVENous PRN Nain Rodriguez MD        0.9 % sodium chloride infusion   IntraVENous PRN Nani Rodriguez MD        scopolamine (TRANSDERM-SCOP) transdermal patch 1 patch  1 patch TransDERmal Q72H Nani Rodriguez MD   1 patch at 02/22/23 1136    ceFAZolin (ANCEF) 2,000 mg in sterile water 20 mL IV syringe  2,000 mg IntraVENous On Call to [address] Jimenez MD           Allergies:     Allergies   Allergen Reactions    Codeine Other (See Comments)     abd cramping    Duloxetine Hcl Other (See Comments)     Other reaction(s): GI upset    Sertraline Other (See Comments)     GI upset    Tramadol Other (See Comments)     Gi distress       Problem List:    Patient Active Problem List   Diagnosis Code    Anemia, deficiency D53.9    Bilateral leg edema R60.0    Gastroesophageal reflux disease without esophagitis K21.9    Lymphedema I89.0    Obstructive sleep apnea syndrome G47.33    Peripheral arterial occlusive disease (HCC) I77.9    Polyneuropathy G62.9    Restless leg syndrome G25.81    Vitamin D deficiency E55.9    Morbid obesity due to excess calories ([facility]) E66.01    History of gastric bypass Z98.84    Postsurgical malabsorption K91.2    Morbid obesity ([facility]) E66.01       Past Medical History:        Diagnosis Date    Anemia     Arthritis     Asthma     Frequency of urination     Lumbago with sciatica     Morbid obesity (HCC)     Nocturia     OAB (overactive bladder)     VIET (obstructive sleep apnea)     PAD (peripheral artery disease) (HCC)     Recurrent UTI     Restless leg     Tendonitis     Urinary urgency     Vitamin B12 deficiency        Past Surgical History:        Procedure Laterality Date    ABDOMINOPLASTY       SECTION      x3.      CHOLECYSTECTOMY      lap    COLONOSCOPY      GASTRIC BYPASS SURGERY  1999    open    HYSTERECTOMY (CERVIX STATUS UNKNOWN)  2013    vaginal    HYSTERECTOMY (CERVIX STATUS UNKNOWN)         Social History:    Social History     Tobacco Use    Smoking status: Never    Smokeless tobacco: Never   Substance Use Topics    Alcohol use: No                                Counseling given: Not Answered      Vital Signs (Current):   Vitals:    02/15/23 1414 23 1056   BP:  (!) 119/52   Pulse:  80   Resp:  20   Temp:  98.2 °F (36.8 °C)   TempSrc:  Oral   SpO2:  100%   Weight: 241 lb (109.3 kg)    Height: 5' 5\" (1.651 m)                                               BP Readings from Last 3 Encounters:   23 (!) 119/52   23 116/64   22 129/68       NPO Status: Time of last liquid consumption: 0000                        Time of last solid consumption: 0                        Date of last liquid consumption: 23                        Date of last solid food consumption: 02/15/23    BMI:   Wt Readings from Last 3 Encounters:   02/15/23 241 lb (109.3 kg)   23 241 lb (109.3 kg)   22 247 lb (112 kg)     Body mass index is 40.1 kg/m².     CBC: No results found for: WBC, RBC, HGB, HCT, MCV, RDW, PLT    CMP: No results found for: NA, K, CL, CO2, BUN, CREATININE, GFRAA, AGRATIO, LABGLOM, GLUCOSE, GLU, PROT, CALCIUM, BILITOT, ALKPHOS, AST, ALT    POC Tests: No results for input(s): POCGLU, POCNA, POCK, POCCL, POCBUN, POCHEMO, POCHCT in the last 72 hours.    Coags: No results found for: PROTIME, INR, APTT    HCG (If Applicable): No results found for: PREGTESTUR, PREGSERUM, HCG, HCGQUANT     ABGs: No results found for: PHART, PO2ART, ZOA3JRI, AJB1QGW, BEART, P3CCLHRV     Type & Screen (If Applicable):  No results found for: LABABO, LABRH    Drug/Infectious Status (If Applicable):  No results found for: HIV, HEPCAB    COVID-19 Screening (If Applicable): No results found for: COVID19        Anesthesia Evaluation  Patient summary reviewed  Airway: Mallampati: I  TM distance: >3 FB   Neck ROM: full  Mouth opening: > = 3 FB   Dental: normal exam         Pulmonary:normal exam    (+) sleep apnea: on CPAP,  asthma:                            Cardiovascular:Negative CV ROS  Exercise tolerance: good (>4 METS),                     Neuro/Psych:   Negative Neuro/Psych ROS              GI/Hepatic/Renal:   (+) GERD:, morbid obesity          Endo/Other: Negative Endo/Other ROS             Pt had PAT visit. Abdominal:             Vascular: negative vascular ROS. Other Findings:           Anesthesia Plan      general     ASA 3       Induction: intravenous. MIPS: Prophylactic antiemetics administered. Anesthetic plan and risks discussed with patient.                         Latanya Rao MD   2/22/2023

## 2023-02-22 NOTE — OP NOTE
History  Chief Complaint   Patient presents with    Wrist Injury     pt presents to ER stating she tripped in the kitchen, put arms out and landed wrong on her left wrist  no head strike, no LOC  does take baby aspirin daily      This 80-year-old female with history of asthma and hypertension states that she tripped in her basement and fell backwards, landing on her left hand with the arm outstretched  She complains of pain in the left wrist but denies any other injury  She denies loss of consciousness, chest pain, palpitations and neurologic changes  Prior to Admission Medications   Prescriptions Last Dose Informant Patient Reported? Taking?    ALPRAZolam (XANAX) 0 5 mg tablet  Self Yes No   NAPROXEN  MG EC tablet  Self Yes No   Sodium Chloride 7 % NEBU  Self Yes No   albuterol (2 5 mg/3 mL) 0 083 % nebulizer solution  Self Yes No   Si 5 mg every 6 (six) hours as needed   albuterol (PROAIR HFA) 90 mcg/act inhaler   No No   Sig: Inhale 2 puffs every 6 (six) hours as needed for wheezing   amLODIPine (NORVASC) 2 5 mg tablet   No No   Sig: Take 1 tablet (2 5 mg total) by mouth daily   aspirin 81 MG tablet  Self Yes No   Sig: Take 81 mg by mouth   benzonatate (TESSALON) 200 MG capsule   No No   Sig: Take 1 capsule (200 mg total) by mouth 3 (three) times a day as needed for cough   lisinopril (ZESTRIL) 5 mg tablet   No No   Sig: TAKE 1 TABLET (5 MG TOTAL) BY MOUTH DAILY   montelukast (SINGULAIR) 10 mg tablet   No No   Sig: Take 1 tablet (10 mg total) by mouth daily at bedtime   pravastatin (PRAVACHOL) 40 mg tablet   No No   Sig: TAKE 1 TABLET (40 MG TOTAL) BY MOUTH DAILY   sertraline (ZOLOFT) 50 mg tablet   No No   Sig: Take 0 5 tablets (25 mg total) by mouth daily      Facility-Administered Medications: None       Past Medical History:   Diagnosis Date    Asthma     C  difficile enteritis     History of acute respiratory failure     Hypertension     Pneumonia     Respiratory disorder  Operative Report    Patient: Elizabeth Evans MRN: [id number]  SSN: [id number]    YOB: 1970  Age: 46 y.o. Sex: female       Date of Surgery: [unfilled]     Preoperative Diagnosis: Morbid obesity ([facility] [E66.01]  Hypertension, unspecified type [I10]  Gastroesophageal reflux disease, unspecified whether esophagitis present [K21.9]  Obstructive sleep apnea [G47.33]  Edema, unspecified type [R60.9]  Postsurgical malabsorption, not elsewhere classified [K91.2]  History of gastric bypass [W04.50]  Complication of bariatric procedure [K95.89]  Anemia, unspecified type [D64.9]  Postsurgical dumping syndrome [K91.1]     Postoperative Diagnosis:   Morbid obesity ([facility] [E66.01]  Hypertension, unspecified type [I10]  Gastroesophageal reflux disease, unspecified whether esophagitis present [K21.9]  Obstructive sleep apnea [G47.33]  Edema, unspecified type [R60.9]  Postsurgical malabsorption, not elsewhere classified [K91.2]  History of gastric bypass [T75.58]  Complication of bariatric procedure [K95.89]  Anemia, unspecified type [D64.9]  Postsurgical dumping syndrome [K91.1]     Surgeon(s) and Role:     * Hillary Durant MD - Primary    Anesthesia: General     Procedure:   Diagnostic laparoscopy with adhesiolysis    FINDINGS:     Diagnostic laparoscopy performed. Retrocolic navid limb noted measuring around 40cm at most before entering the Saleh's space, which remained closed. Common channel measured to be over 700cm. No apparent mesenteric internal hernia at the jejunojejunostomy. Flimsy adhesions noted along the retro-navid mesenteric border to the biliopancreatic limb. After adhesiolysis we weren't able to identify the ligament of Trietz, but noted that the BP limb appeared to herniate under the navid limb mesentery and exit toward the left side of the navid limb mesentery.  It appeared that the original open gastric bypass had been constructed with most of the BP limb on the left of the navid limb mesentery, Past Surgical History:   Procedure Laterality Date    BRONCHOSCOPY      FRACTURE SURGERY      NASAL SINUS SURGERY Bilateral 09/2017    SINUS SURGERY      TUBAL LIGATION      WISDOM TOOTH EXTRACTION         Family History   Problem Relation Age of Onset    Heart disease Father     Heart failure Father     Diabetes Father     Rectal cancer Father     Heart disease Sister     Heart disease Brother     Aortic aneurysm Brother     Sudden death Brother     Cirrhosis Mother     Colon cancer Paternal Grandmother     No Known Problems Daughter     Lung cancer Paternal Uncle     No Known Problems Daughter     No Known Problems Daughter     No Known Problems Maternal Aunt     No Known Problems Maternal Aunt     No Known Problems Maternal Aunt     No Known Problems Paternal Aunt     No Known Problems Paternal Aunt      I have reviewed and agree with the history as documented  Social History     Tobacco Use    Smoking status: Never Smoker    Smokeless tobacco: Never Used   Substance Use Topics    Alcohol use: Yes     Comment: soically    Drug use: No        Review of Systems   Constitutional: Negative  HENT: Negative  Eyes: Negative  Respiratory: Negative  Cardiovascular: Negative  Gastrointestinal: Negative  Endocrine: Negative  Genitourinary: Negative  Musculoskeletal: Positive for back pain (Chronic with left sciatica)  Skin: Negative  Allergic/Immunologic: Negative  Neurological: Negative  Hematological: Negative  Psychiatric/Behavioral: Negative  All other systems reviewed and are negative  Physical Exam  Physical Exam   Constitutional: She is oriented to person, place, and time  She appears well-developed and well-nourished  She appears distressed  HENT:   Head: Normocephalic and atraumatic     Right Ear: External ear normal    Left Ear: External ear normal    Nose: Nose normal    Mouth/Throat: Oropharynx is clear and moist    Eyes: Pupils traversing the retro-navid mesenteric space, with the distal BP limb to the right of the navid limb mesentery and the anastomosis of the BP limb to the common channel in an anti-peristaltic orientation facing the left upper quadrant from the right side of the navid limb mesentery. She has never had any obstructive issues, and revision for weight loss would require extensive reconstruction, likely via laparotomy, but by my judgement not worth the risks involved. I elected not to perform any revision to minimize the risks involved of confusing intestinal anatomy in the event of any required future surgical interventions. Procedure in Detail: Elizabeth Evans was identified in the pre-operative holding area. Informed consent was obtained after a complete discussion of risks, benefits and alternatives to surgery were had with the patient. The patient was brought back to the operating room and placed under general endotracheal anesthesia in the supine position on the operating room table. SCDs were applied. Preop VTE prophylaxis as well as all other multimodal analgesia, GI prophylaxis, etc were verified. The patient was then prepped and draped in the usual sterile fashion after being appropriately padded and secured to the table. A timeout was performed verifying patient identity, planned procedure, medications, and all other pertinent aspects of the case. An incision was made in the left upper quadrant and a Verress needle was introduced into the peritoneal cavity. Saline drop test showed no blood on aspiration and free flow of saline into the peritoneal cavity. The peritoneal cavity was insufflated to 15mmHg without incident. A 5mm optical trocar was introduced, visualizing the layers of the abdominal wall on entry. No evidence of visceral injury was noted from trocar or verress needle placement. A KWAN block was performed under laparoscopic visualization bilaterally.  A 5mm trocar was place in the left lower abdomen under vision. There were adhesions of the omentum to the anterior abdominal wall at the site of her prior open gastric bypass. These were taken down up to the point of the falciform ligament. 5mm trocars were placed in the left periumbilical and right upper quadrant regions and a 12mm trocar was placed in the right lower quadrant.      We began by removing the omentum from the pelvis and placing up over the colon. We lifted the colonic mesentery. We identified the jejunojejunostomy. There were extensive but flimsy interloop adhesions between what we presumed to be the navid limb and BP limb and the transverse mesocolon. The anatomy was unclear here, and I was concerned for an internal hernia. I turned my attention to the right lower quadrant.    There weren't any significant adhesions in the pelvis.     We identified the terminal ileum and measured the common channel up to the jejunojejunostomy. Orientation clips were applied to the common channel at 350cm from the terminal ileum with 1 clip distally and 2 clips proximally. We continued to measure the rest of the common channel up to the jejunojejunostomy and the common channel was over 700cm in length. A single orientation clip was placed on the mesentery of the common channel just distal to the jejunojejunostomy. I did not see evidence of an internal hernia defect at the jejunojejunostomy common mesentery.     We performed adhesiolysis of the interloop adhesions of the BP and navid limb and transverse mesocolon. I traced what appeared to be the navid limb from the jejunojejunostomy to the Saleh's space where the navid limb passed through the transverse mesocolon. The Saleh's space appeared to remain closed.    After adhesiolysis we weren't able to identify the ligament of Trietz, but noted that the BP limb appeared to herniate under the navid limb mesentery and exit toward the left side of the navid limb mesentery. It appeared that the original open  are equal, round, and reactive to light  Conjunctivae and EOM are normal    Neck: Normal range of motion  Neck supple  No JVD present  Cardiovascular: Normal rate, regular rhythm and intact distal pulses  No murmur heard  Pulmonary/Chest: Effort normal and breath sounds normal  She exhibits no tenderness  Abdominal: Soft  Bowel sounds are normal  She exhibits no distension  There is no tenderness  There is no guarding  Musculoskeletal: She exhibits no edema  The left wrist is diffusely swollen and tender  There is no obvious deformity  Range of motion is limited due to pain  Distal capillary refill is brisk  Distal sensation and tendon function are intact  There is no proximal forearm, elbow or humeral deformities, tenderness or crepitance  Neurological: She is alert and oriented to person, place, and time  She has normal reflexes  No cranial nerve deficit  Coordination normal    Skin: Skin is warm and dry  Capillary refill takes less than 2 seconds  No rash noted  She is not diaphoretic  Psychiatric: She has a normal mood and affect  Her behavior is normal    Nursing note and vitals reviewed        Vital Signs  ED Triage Vitals [12/23/19 1653]   Temperature Pulse Respirations Blood Pressure SpO2   98 °F (36 7 °C) 92 19 (!) 192/96 98 %      Temp src Heart Rate Source Patient Position - Orthostatic VS BP Location FiO2 (%)   -- Monitor Lying Right arm --      Pain Score       Worst Possible Pain           Vitals:    12/23/19 1653   BP: (!) 192/96   Pulse: 92   Patient Position - Orthostatic VS: Lying         Visual Acuity      ED Medications  Medications   acetaminophen (TYLENOL) tablet 650 mg (has no administration in time range)   ketorolac (TORADOL) injection 30 mg (30 mg Intramuscular Given 12/23/19 9481)       Diagnostic Studies  Results Reviewed     None                 XR wrist 3+ views LEFT   ED Interpretation by Kirsten Elloitt DO (12/23 2250)   Colles fracture left wrist Procedures  Procedures         ED Course                               [facility]  Number of Diagnoses or Management Options  Closed Colles' fracture of left radius, initial encounter: new and requires workup     Amount and/or Complexity of Data Reviewed  Tests in the radiology section of CPT®: ordered and reviewed  Obtain history from someone other than the patient: yes  Independent visualization of images, tracings, or specimens: yes          Disposition  Final diagnoses:   Closed Colles' fracture of left radius, initial encounter     Time reflects when diagnosis was documented in both MDM as applicable and the Disposition within this note     Time User Action Codes Description Comment    12/23/2019  6:16 PM Juan Britoi Add [[id number]] Closed Colles' fracture of left radius, initial encounter       ED Disposition     ED Disposition Condition Date/Time Comment    Discharge Stable Mon Dec 23, 2019  6:15 PM Real Lacy discharge to home/self care  Follow-up Information     Follow up With Specialties Details Why Contact Info Additional [id number] [facility] Orthopedic Surgery Call in 1 day Follow-up and treatment of your Colle's wrist fracture [phone] [id number] [facility] [phone]  [address] [place] [address] Specialists [address]          Patient's Medications   Discharge Prescriptions    HYDROCODONE-ACETAMINOPHEN (NORCO) 5-325 MG PER TABLET    Take 0 5-1 tablets by mouth every 6 (six) hours as needed for painMax Daily Amount: 4 tablets       Start Date: 12/23/2019End Date: --       Order Dose: 0 5-1 tablets       Quantity: 12 tablet    Refills: 0     No discharge procedures on file      ED Provider  Electronically Signed by           Jeremiah Ramsay,   12/23/19 0220 gastric bypass had been constructed with most of the BP limb and the ligament of Trietz on the left of the navid limb mesentery, with the distal BP limb traversing the retro-navid mesenteric space, with the distal BP limb to the right of the navid limb mesentery and the anastomosis of the BP limb to the common channel in an anti-peristaltic orientation facing the left upper quadrant from the right side of the navid limb mesentery. In short, it appeared as though the original bypass was constructed with a built-in internal hernia. She has never had any obstructive issues, and revision for weight loss would require extensive reconstruction to reorient the entire BP limb and reconstruct the jejunojejunostomy, likely via laparotomy, but by my judgement not worth the risks involved. I elected not to perform any revision to minimize the risks involved of confusing intestinal anatomy in the event of any required future surgical interventions. We placed a 2-0 vicryl suture at the 12 mm trocar site. We removed the trocars under direct visualization. The dermis was closed with 4-0 Monocryl followed by Dermabond for the skin. At the end of the procedure all instrument, needle, and sponge counts were correct. I was present and scrubbed throughout the entirety of the case. The patient awoke from anesthesia and was extubated without complication and sent to PACU in stable condition. Estimated Blood Loss:  minimal    Tourniquet Time: * No tourniquets in log *      Implants: * No implants in log *            Specimens: * No specimens in log *        Drains: None                Complications: None    Counts: Sponge and needle counts were correct times two.     Signed By:  Delonte Miramontes MD     February 22, 2023

## 2023-02-22 NOTE — ANESTHESIA POSTPROCEDURE EVALUATION
Department of Anesthesiology  Postprocedure Note    Patient: Gabriel Plascencia  MRN: [id number]  YOB: 1970  Date of evaluation: 2/22/2023      Procedure Summary     Date: 02/22/23 Room / Location: [facility] MAIN 04 / [facility] MAIN OR    Anesthesia Start: 1227 Anesthesia Stop: 1440    Procedure: Diagnostic LAPAROSCOPY/ LYSIS OF ADHESIONS (Abdomen) Diagnosis:       Morbid obesity (HCC)      Hypertension, unspecified type      Gastroesophageal reflux disease, unspecified whether esophagitis present      Obstructive sleep apnea      Edema, unspecified type      Postsurgical malabsorption, not elsewhere classified      History of gastric bypass      Complication of bariatric procedure      Anemia, unspecified type      Postsurgical dumping syndrome      (Morbid obesity ([facility] [E66.01])      (Hypertension, unspecified type [I10])      (Gastroesophageal reflux disease, unspecified whether esophagitis present [K21.9])      (Obstructive sleep apnea [G47.33])      (Edema, unspecified type [R60.9])      (Postsurgical malabsorption, not elsewhere classified [K91.2])      (History of gastric bypass [H11.00])      (Complication of bariatric procedure [K95.89])      (Anemia, unspecified type [D64.9])      (Postsurgical dumping syndrome [K91.1])    Surgeons: Brandon Lamas MD Responsible Provider: Jim Dawson MD    Anesthesia Type: General ASA Status: 3          Anesthesia Type: General    Bert Phase I: Bert Score: 10    Bert Phase II:        Anesthesia Post Evaluation    Patient location during evaluation: PACU  Patient participation: complete - patient participated  Level of consciousness: lethargic  Pain score: 3  Airway patency: patent  Nausea & Vomiting: no nausea and no vomiting  Complications: no  Cardiovascular status: blood pressure returned to baseline  Respiratory status: acceptable  Hydration status: euvolemic  Multimodal analgesia pain management approach

## 2023-02-22 NOTE — BRIEF OP NOTE
Brief Postoperative Note      Patient: Elizabeth Evans  YOB: 1970  MRN: [id number]    Date of Procedure: 2/22/2023    Pre-Op Diagnosis: Morbid obesity ([facility] [E66.01]  Hypertension, unspecified type [I10]  Gastroesophageal reflux disease, unspecified whether esophagitis present [K21.9]  Obstructive sleep apnea [G47.33]  Edema, unspecified type [R60.9]  Postsurgical malabsorption, not elsewhere classified [K91.2]  History of gastric bypass [T32.07]  Complication of bariatric procedure [K95.89]  Anemia, unspecified type [D64.9]  Postsurgical dumping syndrome [K91.1]    Post-Op Diagnosis:   Morbid obesity ([facility] [E66.01]  Hypertension, unspecified type [I10]  Gastroesophageal reflux disease, unspecified whether esophagitis present [K21.9]  Obstructive sleep apnea [G47.33]  Edema, unspecified type [R60.9]  Postsurgical malabsorption, not elsewhere classified [K91.2]  History of gastric bypass [T76.75]  Complication of bariatric procedure [K95.89]  Anemia, unspecified type [D64.9]  Postsurgical dumping syndrome [K91.1]       PROCEDURE:   Diagnostic laparoscopy with adhesiolysis    Surgeon(s):  Hillary Durant MD    Assistant:  Surgical Assistant: Kitty Bhatt    Anesthesia: General    Estimated Blood Loss (mL): Minimal    Complications: None    Specimens:   * No specimens in log *    Implants:  * No implants in log *      Drains: * No LDAs found *    Findings:     Diagnostic laparoscopy performed. Retrocolic navid limb noted measuring around 40cm at most before entering the Saleh's space, which remained closed. Common channel measured to be over 700cm. No apparent mesenteric internal hernia at the jejunojejunostomy. Flimsy adhesions noted along the retro-navid mesenteric border to the biliopancreatic limb.  After adhesiolysis we weren't able to identify the ligament of Trietz, but noted that the BP limb appeared to herniate under the navid limb mesentery and exit toward the left side of the navid limb mesentery. It appeared that the original open gastric bypass had been constructed with most of the BP limb on the left of the navid limb mesentery, traversing the retro-navid mesenteric space, with the distal BP limb to the right of the navid limb mesentery and the anastomosis of the BP limb to the common channel in an anti-peristaltic orientation facing the left upper quadrant from the right side of the navid limb mesentery. She has never had any obstructive issues, and revision for weight loss would require extensive reconstruction, likely via laparotomy, but by my judgement not worth the risks involved. I elected not to perform any revision to minimize the risks involved of confusing intestinal anatomy in the event of any required future surgical interventions.       Electronically signed by Luis Alfredo Bean MD on 2/22/2023 at 2:16 PM

## 2023-02-22 NOTE — INTERVAL H&P NOTE
Update History & Physical    The patient's History and Physical of January 31, History and procedure was reviewed with the patient and I examined the patient. There was no change. The surgical site was confirmed by the patient and me. Plan: The risks, benefits, expected outcome, and alternative to the recommended procedure have been discussed with the patient. Patient understands and wants to proceed with the procedure.      Electronically signed by Eladia Maurice MD on 2/22/2023 at 11:43 AM

## 2023-02-23 ENCOUNTER — TELEPHONE (OUTPATIENT)
Age: 53
End: 2023-02-23

## 2023-02-23 NOTE — TELEPHONE ENCOUNTER
Patient called and stated she was supposed to have surgery yesterday but Dr. Celina Alba could not operate on her because of some complications he discovered and was not able to proceed with the procedure. Patient says her and her boyfriend did not remember what Dr. Celina Alba told her what went wrong. She is calling today to find out what exactly were the complications because she would like to know before her next follow up. Patient would like a call back.

## 2023-03-07 ENCOUNTER — OFFICE VISIT (OUTPATIENT)
Age: 53
End: 2023-03-07

## 2023-03-07 VITALS
SYSTOLIC BLOOD PRESSURE: 128 MMHG | DIASTOLIC BLOOD PRESSURE: 72 MMHG | HEART RATE: 64 BPM | BODY MASS INDEX: 38.99 KG/M2 | HEIGHT: 65 IN | TEMPERATURE: 98.2 F | RESPIRATION RATE: 18 BRPM | OXYGEN SATURATION: 100 % | WEIGHT: 234 LBS

## 2023-03-07 DIAGNOSIS — Z98.84 HISTORY OF GASTRIC BYPASS: Primary | ICD-10-CM

## 2023-03-07 DIAGNOSIS — K91.2 POSTSURGICAL MALABSORPTION: ICD-10-CM

## 2023-03-07 PROCEDURE — 99024 POSTOP FOLLOW-UP VISIT: CPT | Performed by: SURGERY

## 2023-03-07 RX ORDER — MULTIVIT-MIN/IRON/FOLIC ACID/K 45-800-120
CAPSULE ORAL DAILY
COMMUNITY

## 2023-03-07 NOTE — PROGRESS NOTES
Pearl Singh is a 46 y.o. female  Chief Complaint   Patient presents with    Post-Op Check     LAPAROSCOPY/LYSIS OF ADHESIONS(ABD) 2/23/23     1. Have you been to the ER, urgent care clinic since your last visit? Hospitalized since your last visit? No    2. Have you seen or consulted any other health care providers outside of the [address] [place] [address] since your last visit? Include any pap smears or colon screening.  No

## 2023-03-07 NOTE — LETTER
March 7, 2023       [address] [place] [address]      Dear Elsie Mahoney:    ***    If you have any questions or concerns, please don't hesitate to call.     Sincerely,        Josefina Olson MD

## 2023-03-07 NOTE — PROGRESS NOTES
Bariatric Surgery Post Op Progress Note    CC: follow up diagnostic laparoscopy with adhesiolysis  Subjective:     Sridevi Mendoza  is a 46 y.o. female who presents for follow-up after above procedure. . She has lost a total of 7 pounds since surgery. Body mass index is 38.94 kg/m². Yoan Gutter Denies f/c/cp/sob/peripheral swelling    Nausea: No  Vomiting: No  Dysphagia: No  Reflux: No  Exercise: Yes  MVI: Yes  Calcium citrate: Yes    4 tablets of opiate medication taken postop. Changes in their medical history and medications have been reviewed. Patient Active Problem List   Diagnosis    Anemia, deficiency    Bilateral leg edema    Gastroesophageal reflux disease without esophagitis    Lymphedema    Obstructive sleep apnea syndrome    Peripheral arterial occlusive disease (HCC)    Polyneuropathy    Restless leg syndrome    Vitamin D deficiency    Morbid obesity due to excess calories ([address]    History of gastric bypass    Postsurgical malabsorption    Morbid obesity ([address]     Past Medical History:   Diagnosis Date    Anemia     Arthritis     Asthma     Frequency of urination     Lumbago with sciatica     Morbid obesity (HCC)     Nocturia     OAB (overactive bladder)     VIET (obstructive sleep apnea)     PAD (peripheral artery disease) (HCC)     Recurrent UTI     Restless leg     Tendonitis     Urinary urgency     Vitamin B12 deficiency      Past Surgical History:   Procedure Laterality Date    ABDOMINOPLASTY       SECTION      x3.       CHOLECYSTECTOMY      lap    COLONOSCOPY      GASTRIC BYPASS SURGERY      open    HYSTERECTOMY (CERVIX STATUS UNKNOWN)  2013    vaginal    HYSTERECTOMY (CERVIX STATUS UNKNOWN)      USMAN-EN-Y GASTRIC BYPASS N/A 2023    Diagnostic LAPAROSCOPY/ LYSIS OF ADHESIONS performed by Slime Watson MD at [address] [place] [address]       Objective:   Physical Exam:  Vitals:    23 0949   BP: 128/72   Pulse: 64   Resp: 18   Temp: 98.2 °F (36.8 °C)   SpO2: 100%     General: No acute distress, conversant  Eyes: PERRLA, no scleral icterus  HENT: Normocephalic without oral lesions  Neck: Trachea midline without LAD  Cardiac: Normal pulse rate and rhythm  Pulmonary: Symmetric chest rise with normal effort  GI: Soft, NT, ND, no hernia, no splenomegaly  Skin: Warm without rash  Extremities: No edema or joint stiffness  Psych: Appropriate mood and affect    Assessment:     History of Morbid obesity, status post diagnostic laparoscopy with adhesiolysis    Plan:     Encouraged to adhere to the post operative diet and exercise program.  Advised lifelong vitamin supplementation. Continue routine labs and follow ups.   Attend support group  Follow-up per protocol  Transition to medical weight loss program    lEvira Mariano MD [facility]  Bariatric and General Surgeon  [facility]

## 2023-06-06 ENCOUNTER — OFFICE VISIT (OUTPATIENT)
Age: 53
End: 2023-06-06
Payer: MEDICARE

## 2023-06-06 VITALS
BODY MASS INDEX: 40.32 KG/M2 | RESPIRATION RATE: 17 BRPM | HEART RATE: 68 BPM | DIASTOLIC BLOOD PRESSURE: 63 MMHG | SYSTOLIC BLOOD PRESSURE: 117 MMHG | HEIGHT: 65 IN | WEIGHT: 242 LBS | TEMPERATURE: 97.7 F | OXYGEN SATURATION: 98 %

## 2023-06-06 DIAGNOSIS — Z71.3 WEIGHT LOSS COUNSELING, ENCOUNTER FOR: ICD-10-CM

## 2023-06-06 DIAGNOSIS — E66.01 MORBID OBESITY (HCC): Primary | ICD-10-CM

## 2023-06-06 DIAGNOSIS — Z71.3 ENCOUNTER FOR DIETARY CONSULTATION: ICD-10-CM

## 2023-06-06 PROCEDURE — 99203 OFFICE O/P NEW LOW 30 MIN: CPT | Performed by: NURSE PRACTITIONER

## 2023-06-06 PROCEDURE — 1036F TOBACCO NON-USER: CPT | Performed by: NURSE PRACTITIONER

## 2023-06-06 PROCEDURE — G8427 DOCREV CUR MEDS BY ELIG CLIN: HCPCS | Performed by: NURSE PRACTITIONER

## 2023-06-06 PROCEDURE — 3017F COLORECTAL CA SCREEN DOC REV: CPT | Performed by: NURSE PRACTITIONER

## 2023-06-06 PROCEDURE — G8417 CALC BMI ABV UP PARAM F/U: HCPCS | Performed by: NURSE PRACTITIONER

## 2023-06-06 RX ORDER — MECLIZINE HCL 12.5 MG/1
TABLET ORAL
COMMUNITY
Start: 2023-01-09

## 2023-06-06 RX ORDER — LIRAGLUTIDE 6 MG/ML
INJECTION, SOLUTION SUBCUTANEOUS
Qty: 5 ADJUSTABLE DOSE PRE-FILLED PEN SYRINGE | Refills: 4 | Status: SHIPPED | OUTPATIENT
Start: 2023-06-06

## 2023-06-06 ASSESSMENT — ENCOUNTER SYMPTOMS
RESPIRATORY NEGATIVE: 1
EYES NEGATIVE: 1
ALLERGIC/IMMUNOLOGIC NEGATIVE: 1
GASTROINTESTINAL NEGATIVE: 1

## 2023-06-06 NOTE — PROGRESS NOTES
Weight Loss Progress Note: Initial Physician Visit      Tula Schilder is a 48 y.o. female with BMI 40.1 who is here for her initial evaluation for the medical weight loss medication program. Patient with history of gastric bypass in 1999 with 130lbs weight loss, but over time she has found that she has started to regain the weight back. CC: Obsesity    Weight History  Current weight 242lb   Goal weight 155lbs  Highest weight 282lbs   (See weight gain time line scanned into media section of chart)    Food intolerances (gas, bloating, diarrhea, vomiting)? None. Weight loss History  How many weight loss attempts have you had? Surgical weight loss in 1999. Which program were you most successful doing? GBP 282lb-150lb. Significant Medical History    Have you ever taken appetite suppressants? Yes. If yes: Rx or OTC? Phentermine for about a year - became tolerant after a few months. If yes; Any negative side effects? Ever diagnosed with sleep apnea or put on CPAP? Yes. But not under treatment. Ever had bariatric surgery?: GBP in 1999. Nicole-en-Y GBP in 2023 (Dr. Kendra Price)    Pregnant or planning on becoming pregnant w/in 6 months: No.      Significant Psychosocial History   Any history of drug abuse or dependence: No.  Current Major Lifestyle Changes: Pre-diabetic, Sleep Apnea, Neuropathy, Asthma. Any potential unsupportive people: No.  Why are you starting a weight loss program now? Patient desires to lose weight for the health aspect - asthma has worsened since gaining weight. Patient states has also become pre-diabetic in which she would like to avoid becoming fully diabetic. Are you ready? Yes. History of binge eating disorder or anorexia: No.  If yes, are you currently being treated? Social History  Social History     Tobacco Use    Smoking status: Never    Smokeless tobacco: Never   Substance Use Topics    Alcohol use: No     How many times a week do you eat out?  Patient eats at home

## 2023-06-08 ENCOUNTER — TELEPHONE (OUTPATIENT)
Age: 53
End: 2023-06-08

## 2023-06-08 NOTE — TELEPHONE ENCOUNTER
Patient requesting to speak NP Taylor Li regarding if authorization went through for her medication.

## 2023-06-08 NOTE — TELEPHONE ENCOUNTER
Contacted patient and informed her that insurance denied Michael De La Vega but David sent in an appeal and is currently waiting to hear a determination. Advised patient that ether I or Candy will touch base with her once we have heard from her insurance - hopefully within 3-5 days. Patient understood.

## 2023-07-12 ENCOUNTER — OFFICE VISIT (OUTPATIENT)
Age: 53
End: 2023-07-12
Payer: MEDICARE

## 2023-07-12 VITALS
OXYGEN SATURATION: 98 % | BODY MASS INDEX: 39.15 KG/M2 | HEART RATE: 65 BPM | SYSTOLIC BLOOD PRESSURE: 113 MMHG | TEMPERATURE: 97.7 F | RESPIRATION RATE: 17 BRPM | WEIGHT: 235 LBS | DIASTOLIC BLOOD PRESSURE: 73 MMHG | HEIGHT: 65 IN

## 2023-07-12 DIAGNOSIS — E66.01 CLASS 2 SEVERE OBESITY DUE TO EXCESS CALORIES WITH SERIOUS COMORBIDITY AND BODY MASS INDEX (BMI) OF 38.0 TO 38.9 IN ADULT (HCC): Primary | ICD-10-CM

## 2023-07-12 DIAGNOSIS — Z71.3 ENCOUNTER FOR DIETARY CONSULTATION: ICD-10-CM

## 2023-07-12 DIAGNOSIS — Z71.3 WEIGHT LOSS COUNSELING, ENCOUNTER FOR: ICD-10-CM

## 2023-07-12 PROCEDURE — 99213 OFFICE O/P EST LOW 20 MIN: CPT | Performed by: NURSE PRACTITIONER

## 2023-07-12 PROCEDURE — 1036F TOBACCO NON-USER: CPT | Performed by: NURSE PRACTITIONER

## 2023-07-12 PROCEDURE — 3017F COLORECTAL CA SCREEN DOC REV: CPT | Performed by: NURSE PRACTITIONER

## 2023-07-12 PROCEDURE — G8427 DOCREV CUR MEDS BY ELIG CLIN: HCPCS | Performed by: NURSE PRACTITIONER

## 2023-07-12 PROCEDURE — G8417 CALC BMI ABV UP PARAM F/U: HCPCS | Performed by: NURSE PRACTITIONER

## 2023-07-12 RX ORDER — SEMAGLUTIDE 0.68 MG/ML
INJECTION, SOLUTION SUBCUTANEOUS
COMMUNITY
Start: 2023-06-15

## 2023-07-12 ASSESSMENT — ENCOUNTER SYMPTOMS
GASTROINTESTINAL NEGATIVE: 1
EYES NEGATIVE: 1
RESPIRATORY NEGATIVE: 1
ALLERGIC/IMMUNOLOGIC NEGATIVE: 1

## 2023-07-12 NOTE — PROGRESS NOTES
ordered  No orders of the defined types were placed in this encounter. There were no encounter diagnoses.               Read WILLIAM Saleem - CNP

## 2023-08-24 ENCOUNTER — OFFICE VISIT (OUTPATIENT)
Age: 53
End: 2023-08-24
Payer: MEDICARE

## 2023-08-24 VITALS
RESPIRATION RATE: 17 BRPM | WEIGHT: 230 LBS | HEART RATE: 66 BPM | HEIGHT: 65 IN | OXYGEN SATURATION: 99 % | TEMPERATURE: 97.7 F | DIASTOLIC BLOOD PRESSURE: 60 MMHG | SYSTOLIC BLOOD PRESSURE: 118 MMHG | BODY MASS INDEX: 38.32 KG/M2

## 2023-08-24 DIAGNOSIS — Z71.3 ENCOUNTER FOR DIETARY CONSULTATION: ICD-10-CM

## 2023-08-24 DIAGNOSIS — E66.01 CLASS 2 SEVERE OBESITY DUE TO EXCESS CALORIES WITH SERIOUS COMORBIDITY AND BODY MASS INDEX (BMI) OF 38.0 TO 38.9 IN ADULT (HCC): Primary | ICD-10-CM

## 2023-08-24 DIAGNOSIS — Z71.3 WEIGHT LOSS COUNSELING, ENCOUNTER FOR: ICD-10-CM

## 2023-08-24 PROCEDURE — 99213 OFFICE O/P EST LOW 20 MIN: CPT | Performed by: NURSE PRACTITIONER

## 2023-08-24 PROCEDURE — 3017F COLORECTAL CA SCREEN DOC REV: CPT | Performed by: NURSE PRACTITIONER

## 2023-08-24 PROCEDURE — G8417 CALC BMI ABV UP PARAM F/U: HCPCS | Performed by: NURSE PRACTITIONER

## 2023-08-24 PROCEDURE — G8427 DOCREV CUR MEDS BY ELIG CLIN: HCPCS | Performed by: NURSE PRACTITIONER

## 2023-08-24 PROCEDURE — 1036F TOBACCO NON-USER: CPT | Performed by: NURSE PRACTITIONER

## 2023-08-24 ASSESSMENT — ENCOUNTER SYMPTOMS
EYES NEGATIVE: 1
RESPIRATORY NEGATIVE: 1

## 2023-11-15 ENCOUNTER — OFFICE VISIT (OUTPATIENT)
Age: 53
End: 2023-11-15
Payer: MEDICARE

## 2023-11-15 VITALS
BODY MASS INDEX: 37.82 KG/M2 | DIASTOLIC BLOOD PRESSURE: 72 MMHG | HEART RATE: 72 BPM | OXYGEN SATURATION: 99 % | HEIGHT: 65 IN | TEMPERATURE: 97.7 F | SYSTOLIC BLOOD PRESSURE: 128 MMHG | RESPIRATION RATE: 18 BRPM | WEIGHT: 227 LBS

## 2023-11-15 DIAGNOSIS — Z71.3 ENCOUNTER FOR DIETARY CONSULTATION: ICD-10-CM

## 2023-11-15 DIAGNOSIS — Z71.3 WEIGHT LOSS COUNSELING, ENCOUNTER FOR: ICD-10-CM

## 2023-11-15 DIAGNOSIS — E66.01 CLASS 2 SEVERE OBESITY DUE TO EXCESS CALORIES WITH SERIOUS COMORBIDITY AND BODY MASS INDEX (BMI) OF 37.0 TO 37.9 IN ADULT (HCC): Primary | ICD-10-CM

## 2023-11-15 PROCEDURE — G8417 CALC BMI ABV UP PARAM F/U: HCPCS | Performed by: NURSE PRACTITIONER

## 2023-11-15 PROCEDURE — G8484 FLU IMMUNIZE NO ADMIN: HCPCS | Performed by: NURSE PRACTITIONER

## 2023-11-15 PROCEDURE — 3017F COLORECTAL CA SCREEN DOC REV: CPT | Performed by: NURSE PRACTITIONER

## 2023-11-15 PROCEDURE — 1036F TOBACCO NON-USER: CPT | Performed by: NURSE PRACTITIONER

## 2023-11-15 PROCEDURE — 99213 OFFICE O/P EST LOW 20 MIN: CPT | Performed by: NURSE PRACTITIONER

## 2023-11-15 PROCEDURE — G8427 DOCREV CUR MEDS BY ELIG CLIN: HCPCS | Performed by: NURSE PRACTITIONER

## 2023-11-15 ASSESSMENT — ENCOUNTER SYMPTOMS
EYES NEGATIVE: 1
RESPIRATORY NEGATIVE: 1

## 2024-06-24 ENCOUNTER — OFFICE VISIT (OUTPATIENT)
Age: 54
End: 2024-06-24

## 2024-06-24 VITALS
TEMPERATURE: 97 F | WEIGHT: 225 LBS | HEIGHT: 65 IN | HEART RATE: 67 BPM | DIASTOLIC BLOOD PRESSURE: 68 MMHG | SYSTOLIC BLOOD PRESSURE: 119 MMHG | OXYGEN SATURATION: 100 % | BODY MASS INDEX: 37.49 KG/M2

## 2024-06-24 DIAGNOSIS — Z71.3 ENCOUNTER FOR DIETARY CONSULTATION: ICD-10-CM

## 2024-06-24 DIAGNOSIS — Z98.84 HISTORY OF GASTRIC BYPASS: ICD-10-CM

## 2024-06-24 DIAGNOSIS — Z79.899 FOLLOW-UP ENCOUNTER INVOLVING MEDICATION: Primary | ICD-10-CM

## 2024-06-24 DIAGNOSIS — E88.819 INSULIN RESISTANCE: ICD-10-CM

## 2024-06-24 DIAGNOSIS — Z71.3 WEIGHT LOSS COUNSELING, ENCOUNTER FOR: ICD-10-CM

## 2024-06-24 DIAGNOSIS — R73.03 PREDIABETES: ICD-10-CM

## 2024-06-24 DIAGNOSIS — Z86.39 HX OF DIABETES MELLITUS: ICD-10-CM

## 2024-06-24 RX ORDER — SEMAGLUTIDE 1.34 MG/ML
1 INJECTION, SOLUTION SUBCUTANEOUS
Qty: 3 ML | Refills: 1 | Status: SHIPPED | OUTPATIENT
Start: 2024-06-24

## 2024-06-24 NOTE — PROGRESS NOTES
[facility] Nurse Note:     CC: Weight Management    Linda Arias is a 54 y.o. female who is here for her f/up medical provider visit for the  [facility]  program.     Did you have any problems adhering to the program since last visit? No  If yes, please explain:     Since your last visit, have you experienced any complications? No    Have you received any other medical care since last visit? Yes  If yes, where and for what? Went to ER for abdominal and back pain     Have you had any change in your medications since your last visit? No If yes what?     Are you taking an anti-obesity medication? Yes If yes what and are you experiencing any side effects? Ozempic prescribed by PCP for the treatment of DM and obesity     Would you still like to continue your current medication and dosage? Yes    BP Readings from Last 3 Encounters:   06/24/24 119/68   11/15/23 128/72   08/24/23 118/60      Eating Habits Over Last Week:    How many oz of sugar-free fluids are you consuming? 48 oz, encouraged to drink at least 64 oz daily      Did you consume your prescribed meal replacement regimen each day?  N/A    Physical Activity Routine:    Aerobic exercise: Walking daily     Resistance exercise: 0    
hour(s)).    Assessment / Plan  Encounter Diagnoses   Name Primary?    Weight loss counseling, encounter for     Encounter for dietary consultation     History of gastric bypass     Hx of diabetes mellitus     Prediabetes     Follow-up encounter involving medication Yes    Insulin resistance      1.  Weight management      Today, the patient is  Height: 165.1 cm (5' 5\") tall, Weight - Scale: 102.1 kg (225 lb) lbs for a Body mass index is 37.44 kg/m².  is suffering from prediabetes, recurring obesity.     Evaluation of progress: Was prescribed Ozempic by PCP for the treatment of diabetes. Therapy effective, currently in remission. Due for recheck A1c. Would like to continue medication. Titrate as directed. Order 1 mg/dose weekly, 4mg/3 ml, R1.      Goal(s) for next appointment:   Order Ozempic 1 mg/dose. Effective for DM and obesity management.     Diet and Exercise:  Low Calorie Diet Plan. Totals per day: 1,000-1,200 calories, 80+ grams protein, 60 grams carbs. Patient instructed to access [employer] for detailed instructions for dietary treatment plan, additional nutrition education videos, and logging daily consumption.  Exercise Plan: 150 minutes exercise weekly. Patient instructed to access [employer] to log daily exercise.    2.  Labs    Latest results reviewed with patient    The primary encounter diagnosis was Follow-up encounter involving medication. Diagnoses of Weight loss counseling, encounter for, Encounter for dietary consultation, History of gastric bypass, Hx of diabetes mellitus, Prediabetes, and Insulin resistance were also pertinent to this visit.    Return in about 5 weeks (around 7/29/2024) for Medication follow up, Weight management.       WILLIAM Fitzgerald NP

## 2024-06-25 ENCOUNTER — TELEPHONE (OUTPATIENT)
Age: 54
End: 2024-06-25

## 2024-07-26 ENCOUNTER — TELEPHONE (OUTPATIENT)
Age: 54
End: 2024-07-26

## 2024-07-26 NOTE — TELEPHONE ENCOUNTER
Pt was unable to secure transportation for her appointment on 7/29/24 and requested that it be switched to virtual. She also stated that if its unable to be on Monday, she can do a virtual on another day.

## 2024-07-29 ENCOUNTER — TELEMEDICINE (OUTPATIENT)
Age: 54
End: 2024-07-29
Payer: MEDICARE

## 2024-07-29 DIAGNOSIS — Z98.84 HISTORY OF GASTRIC BYPASS: ICD-10-CM

## 2024-07-29 DIAGNOSIS — Z71.3 ENCOUNTER FOR DIETARY CONSULTATION: ICD-10-CM

## 2024-07-29 DIAGNOSIS — R73.03 PREDIABETES: ICD-10-CM

## 2024-07-29 DIAGNOSIS — Z71.3 WEIGHT LOSS COUNSELING, ENCOUNTER FOR: ICD-10-CM

## 2024-07-29 DIAGNOSIS — Z86.39 HX OF DIABETES MELLITUS: Primary | ICD-10-CM

## 2024-07-29 PROCEDURE — G8427 DOCREV CUR MEDS BY ELIG CLIN: HCPCS | Performed by: REGISTERED NURSE

## 2024-07-29 PROCEDURE — 99214 OFFICE O/P EST MOD 30 MIN: CPT | Performed by: REGISTERED NURSE

## 2024-07-29 PROCEDURE — 3017F COLORECTAL CA SCREEN DOC REV: CPT | Performed by: REGISTERED NURSE

## 2024-07-29 RX ORDER — SEMAGLUTIDE 2.68 MG/ML
2 INJECTION, SOLUTION SUBCUTANEOUS
Qty: 3 ML | Refills: 4 | Status: SHIPPED | OUTPATIENT
Start: 2024-07-29

## 2024-07-29 NOTE — PROGRESS NOTES
[facility] Nurse Note:     CC: Weight Management    Linda Arias is a 54 y.o. female who is here for her f/up medical provider visit for the [facility] program. Hx of gastric bypass.     Did you have any problems adhering to the program since last visit? No  If yes, please explain:     Since your last visit, have you experienced any complications? No    Have you received any other medical care since last visit? No  If yes, where and for what?     Have you had any change in your medications since your last visit? No If yes what?     Are you taking an anti-obesity medication? No If yes what and are you experiencing any side effects? Taking Ozempic for DMII management. Denies SE other than some appetite suppression.     Would you still like to continue your current medication and dosage? Yes    BP Readings from Last 3 Encounters:   06/24/24 119/68   11/15/23 128/72   08/24/23 118/60      Eating Habits Over Last Week:    How many oz of sugar-free fluids are you consuming? 64 oz     Did you consume your prescribed meal replacement regimen each day?  N/A    Physical Activity Routine:    Aerobic exercise: Walking on treadmill 3 days a week, physical therapy 2 days a week    Resistance exercise: 0

## 2024-07-29 NOTE — PROGRESS NOTES
[facility] Progress Note:   F/up Provider Visit    Chief Complaint   Patient presents with    Weight Management     Medical Weight Loss      Linda Arias is a 54 y.o. female who is here for her f/up medical provider visit for the LCD program. Hx of gastric bypass.         2024    10:19 AM 2024    10:45 AM   Non-Surgical Weight Loss Tracker   Consult Date 2024    Initial Height 5' 5\"    Ideal Body Weight 134 lb    Date  2024   Height  5' 5\"   Weight  218 lb   BMI  36.27   Weight Change  218 lb   Total Weight Change  0 lb   % [facility]  <UNK>%   Subsequent Body Fat %  43.52   Body Fat % Change  43.52     Down 7 lbs since last visit. Down 7 lbs since starting program on 2023.    See nurse note for additional subjective information.     Patient weight not recorded  There is no height or weight on file to calculate BMI.    BP Readings from Last 3 Encounters:   24 119/68   11/15/23 128/72   23 118/60      History    Past Medical History:   Diagnosis Date    Allergic rhinitis     Anemia     Anxiety     Arthritis     Asthma     Frequency of urination     GERD (gastroesophageal reflux disease)     Headache     Hearing loss     Hyperlipidemia     Lumbago with sciatica     Morbid obesity (HCC)     Nocturia     OAB (overactive bladder)     VIET (obstructive sleep apnea)     Osteoarthritis     PAD (peripheral artery disease) (HCC)     Recurrent UTI     Restless leg     Tendonitis     Urinary urgency     Vitamin B12 deficiency      Past Surgical History:   Procedure Laterality Date    ABDOMINOPLASTY       SECTION      x3.      CHOLECYSTECTOMY      lap    COLONOSCOPY      GASTRIC BYPASS SURGERY      open    HYSTERECTOMY (CERVIX STATUS UNKNOWN)  2013    vaginal    HYSTERECTOMY (CERVIX STATUS UNKNOWN)      PARTIAL HYSTERECTOMY (CERVIX NOT REMOVED)      USMAN-EN-Y GASTRIC BYPASS N/A 2023    Diagnostic LAPAROSCOPY/ LYSIS OF ADHESIONS performed by Yuri

## 2024-09-30 ENCOUNTER — TELEPHONE (OUTPATIENT)
Age: 54
End: 2024-09-30

## 2024-09-30 NOTE — TELEPHONE ENCOUNTER
I called the patient and left a voicemail to confirm if she had given [facility] permission to request medical records from us.

## 2024-12-11 DIAGNOSIS — R73.03 PREDIABETES: ICD-10-CM

## 2024-12-11 DIAGNOSIS — Z71.3 ENCOUNTER FOR DIETARY CONSULTATION: ICD-10-CM

## 2024-12-11 DIAGNOSIS — Z98.84 HISTORY OF GASTRIC BYPASS: ICD-10-CM

## 2024-12-11 DIAGNOSIS — Z71.3 WEIGHT LOSS COUNSELING, ENCOUNTER FOR: ICD-10-CM

## 2024-12-11 DIAGNOSIS — Z86.39 HX OF DIABETES MELLITUS: ICD-10-CM

## 2024-12-11 RX ORDER — SEMAGLUTIDE 2.68 MG/ML
INJECTION, SOLUTION SUBCUTANEOUS
Qty: 3 ML | Refills: 2 | Status: SHIPPED | OUTPATIENT
Start: 2024-12-11

## 2025-03-01 DIAGNOSIS — Z98.84 HISTORY OF GASTRIC BYPASS: ICD-10-CM

## 2025-03-01 DIAGNOSIS — R73.03 PREDIABETES: ICD-10-CM

## 2025-03-01 DIAGNOSIS — Z86.39 HX OF DIABETES MELLITUS: ICD-10-CM

## 2025-03-01 DIAGNOSIS — Z71.3 WEIGHT LOSS COUNSELING, ENCOUNTER FOR: ICD-10-CM

## 2025-03-01 DIAGNOSIS — Z71.3 ENCOUNTER FOR DIETARY CONSULTATION: ICD-10-CM

## 2025-03-01 DIAGNOSIS — E66.9 OBESITY (BMI 30-39.9): Primary | ICD-10-CM

## 2025-03-03 RX ORDER — SEMAGLUTIDE 2.68 MG/ML
INJECTION, SOLUTION SUBCUTANEOUS
Qty: 3 ML | Refills: 0 | Status: SHIPPED | OUTPATIENT
Start: 2025-03-03

## 2025-03-27 DIAGNOSIS — R73.03 PREDIABETES: ICD-10-CM

## 2025-03-27 DIAGNOSIS — Z71.3 WEIGHT LOSS COUNSELING, ENCOUNTER FOR: ICD-10-CM

## 2025-03-27 DIAGNOSIS — Z98.84 HISTORY OF GASTRIC BYPASS: ICD-10-CM

## 2025-03-27 DIAGNOSIS — Z86.39 HX OF DIABETES MELLITUS: ICD-10-CM

## 2025-03-27 DIAGNOSIS — Z71.3 ENCOUNTER FOR DIETARY CONSULTATION: ICD-10-CM

## 2025-03-27 DIAGNOSIS — E66.9 OBESITY (BMI 30-39.9): ICD-10-CM

## 2025-03-28 RX ORDER — SEMAGLUTIDE 2.68 MG/ML
INJECTION, SOLUTION SUBCUTANEOUS
Qty: 3 ML | Refills: 0 | OUTPATIENT
Start: 2025-03-28

## 2025-04-03 DIAGNOSIS — E66.9 OBESITY (BMI 30-39.9): ICD-10-CM

## 2025-04-03 DIAGNOSIS — Z98.84 HISTORY OF GASTRIC BYPASS: ICD-10-CM

## 2025-04-03 DIAGNOSIS — R73.03 PREDIABETES: ICD-10-CM

## 2025-04-03 DIAGNOSIS — Z71.3 WEIGHT LOSS COUNSELING, ENCOUNTER FOR: ICD-10-CM

## 2025-04-03 DIAGNOSIS — Z71.3 ENCOUNTER FOR DIETARY CONSULTATION: ICD-10-CM

## 2025-04-03 DIAGNOSIS — Z86.39 HX OF DIABETES MELLITUS: ICD-10-CM

## 2025-04-05 RX ORDER — SEMAGLUTIDE 2.68 MG/ML
INJECTION, SOLUTION SUBCUTANEOUS
Qty: 3 ML | Refills: 4 | Status: SHIPPED | OUTPATIENT
Start: 2025-04-05

## 2025-05-15 ENCOUNTER — CLINICAL DOCUMENTATION (OUTPATIENT)
Age: 55
End: 2025-05-15

## 2025-05-15 NOTE — PROGRESS NOTES
Patient is automatically discharged from the Medical Weight Loss Program due to non compliance with provider follow-up policy.       All future appointments, if applicable, with [facility] provider will be automatically cancelled.  Patient is no longer eligible to purchase [facility] products.   All unused services are forfeit.  There may be a waiting period and renewal fee required for re-entry into the program.      No future appointments.

## (undated) DEVICE — GLOVE ORTHO 7 1/2   MSG9475

## (undated) DEVICE — YANKAUER,SMOOTH HANDLE,HIGH CAPACITY: Brand: MEDLINE INDUSTRIES, INC.

## (undated) DEVICE — NEEDLE SPNL 20GA L3.5IN YEL HUB S STL REG WALL FIT STYL W/

## (undated) DEVICE — MEDI-VAC NON-CONDUCTIVE SUCTION TUBING: Brand: CARDINAL HEALTH

## (undated) DEVICE — SUTURE MCRYL SZ 4-0 L18IN ABSRB UD L19MM PS-2 3/8 CIR PRIM Y496G

## (undated) DEVICE — ELECTRODE ES 36CM LAP FLAT L HK COAT DISP CLEANCOAT

## (undated) DEVICE — CANNULA NSL AD TBNG L14FT STD PVC O2 CRV CONN NONFLARED NSL

## (undated) DEVICE — SYRINGE,TOOMEY,IRRIGATION,70CC,STERILE: Brand: MEDLINE

## (undated) DEVICE — BASIN EMSIS 16OZ GRAPHITE PLAS KID SHP MOLD GRAD FOR ORAL

## (undated) DEVICE — DISPOSABLE LAPAROSCOPIC CLIP APPLIER WITH 20 CLIPS.: Brand: EPIX® UNIVERSAL CLIP APPLIER

## (undated) DEVICE — SYRINGE MED 25GA 3ML L5/8IN SUBQ PLAS W/ DETACH NDL SFTY

## (undated) DEVICE — NEEDLE HYPO 25GA L1.5IN BVL ORIENTED ECLIPSE

## (undated) DEVICE — SOLUTION IV 1000ML 0.9% SOD CHL

## (undated) DEVICE — COVER,LIGHT HANDLE,FLX,1/PK: Brand: MEDLINE INDUSTRIES, INC.

## (undated) DEVICE — ENDOSCOPY PUMP TUBING/ CAP SET: Brand: ERBE

## (undated) DEVICE — GAUZE,SPONGE,4"X4",16PLY,STRL,LF,10/TRAY: Brand: MEDLINE

## (undated) DEVICE — STAPLE INT BIOABSRB REINF FOR ETHICON FLX ENDOPATH 60 PWR +

## (undated) DEVICE — 4-PORT MANIFOLD: Brand: NEPTUNE 2

## (undated) DEVICE — LINER SUCT CANSTR 3000CC PLAS SFT PRE ASSEMB W/OUT TBNG W/

## (undated) DEVICE — ADHESIVE SKIN CLSR 0.7ML TOP DERMBND ADV

## (undated) DEVICE — STERILE POLYISOPRENE POWDER-FREE SURGICAL GLOVES: Brand: PROTEXIS

## (undated) DEVICE — STAPLER SKIN L440MM 32MM LNG 12 FIRING B FRM PWR + GRIPPING

## (undated) DEVICE — SYRINGE, LUER LOCK, 10ML: Brand: MEDLINE

## (undated) DEVICE — FLUFF AND POLYMER UNDERPAD,EXTRA HEAVY: Brand: WINGS

## (undated) DEVICE — SHEARS LAP L45CM DIA5MM ULTRASONIC CRV TIP ADV HEMSTAS HARM

## (undated) DEVICE — SYR 50ML SLIP TIP NSAF LF STRL --

## (undated) DEVICE — RELOAD STPL L60MM H1-2.6MM MESENTERY THN TISS WHT 6 ROW

## (undated) DEVICE — FORCEPS BX L240CM JAW DIA2.4MM ORNG L CAP W/ NDL DISP RAD

## (undated) DEVICE — SOLUTION ANTIFOG VIS SYS CLEARIFY LAPSCP

## (undated) DEVICE — BLANKET WRM W29.9XL79.1IN UP BODY FORC AIR MISTRAL-AIR

## (undated) DEVICE — CATHETER SUCT TR FL TIP 14FR W/ O CTRL

## (undated) DEVICE — BITE BLOCK ENDOSCP UNIV AD 6 TO 9.4 MM

## (undated) DEVICE — SYRINGE MED 50ML LUERLOCK TIP

## (undated) DEVICE — SUTURE VCRL SZ 2-0 L27IN ABSRB UD L26MM SH 1/2 CIR J417H

## (undated) DEVICE — RELOAD STPL L60MM H1.5-3.6MM REG TISS BLU GRIPPING SURF B

## (undated) DEVICE — 2, DISPOSABLE SUCTION/IRRIGATOR WITH DISPOSABLE TIP: Brand: STRYKEFLOW

## (undated) DEVICE — DRAPE TOWEL: Brand: CONVERTORS

## (undated) DEVICE — SOLUTION IRRIG 1000ML H2O STRL BLT

## (undated) DEVICE — LAPAROSCOPIC TROCAR SLEEVE/SINGLE USE: Brand: KII® OPTICAL ACCESS SYSTEM

## (undated) DEVICE — TAPE ADH W3INXL10YD PLAS TRNSPAR H2O RESIST HYPOALRG CURAD

## (undated) DEVICE — SUTURE MCRYL SZ 4-0 L27IN ABSRB UD L24MM PS-1 3/8 CIR PRIM Y935H

## (undated) DEVICE — UNDERPAD INCONT W23XL36IN STD BLU POLYPR BK FLUF SFT

## (undated) DEVICE — TROCAR: Brand: KII® SLEEVE

## (undated) DEVICE — BOWL MED L 32OZ PLAS W/ MOLD GRAD EZ OPN PEEL PCH

## (undated) DEVICE — Device

## (undated) DEVICE — SCISSORS ENDOSCP DIA5MM CRV MPLR CAUT W/ RATCH HNDL

## (undated) DEVICE — ELECTRODE PT RET AD L9FT HI MOIST COND ADH HYDRGEL CORDED

## (undated) DEVICE — SYSTEM EVAC SMOKE LAPARSCOPIC

## (undated) DEVICE — SYRINGE MED 30ML STD CLR PLAS LUERLOCK TIP N CTRL DISP

## (undated) DEVICE — TROCAR: Brand: KII® OPTICAL ACCESS SYSTEM

## (undated) DEVICE — DEVICE SUT SHFT L34CM DIA 10MM 2 JAW LD UNIT ENDOSTCH

## (undated) DEVICE — SYRINGE MED 50ML LUERSLIP TIP

## (undated) DEVICE — SUTURE ENDO STITCH POLYSORB VIOLET SIZE 3/0 7 IN 170070

## (undated) DEVICE — INTENDED FOR TISSUE SEPARATION, AND OTHER PROCEDURES THAT REQUIRE A SHARP SURGICAL BLADE TO PUNCTURE OR CUT.: Brand: BARD-PARKER ® STAINLESS STEEL BLADES

## (undated) DEVICE — BANDAGE,GAUZE,BULKEE II,4.5"X4.1YD,STRL: Brand: MEDLINE

## (undated) DEVICE — TRUE CONTENT TO BE POPULATED AS PART OF REBRANDING: Brand: ARGYLE

## (undated) DEVICE — DEVICE SUT SZ 2-0 L7IN GRN SURGDAC POLY BRAID SGL STIT DISP